# Patient Record
Sex: FEMALE | Race: OTHER | Employment: UNEMPLOYED | URBAN - METROPOLITAN AREA
[De-identification: names, ages, dates, MRNs, and addresses within clinical notes are randomized per-mention and may not be internally consistent; named-entity substitution may affect disease eponyms.]

---

## 2018-04-10 ENCOUNTER — HOSPITAL ENCOUNTER (OUTPATIENT)
Age: 58
Discharge: HOME | End: 2018-04-10
Payer: MEDICAID

## 2018-04-10 DIAGNOSIS — E03.9 ACQUIRED HYPOTHYROIDISM: ICD-10-CM

## 2018-04-10 DIAGNOSIS — E11.8 TYPE 2 DIABETES MELLITUS WITH COMPLICATION, WITHOUT LONG-TERM CURRENT USE OF INSULIN (HCC): ICD-10-CM

## 2018-04-10 LAB
CHOLESTEROL, FASTING: 151 MG/DL (ref 0–199)
HBA1C MFR BLD: 5.2 % (ref 4.8–5.9)
HDLC SERPL-MCNC: 43 MG/DL
LDL CHOLESTEROL CALCULATED: 68 MG/DL (ref 0–99)
TRIGLYCERIDE, FASTING: 201 MG/DL (ref 0–149)
TSH SERPL DL<=0.05 MIU/L-ACNC: 7.32 UIU/ML (ref 0.27–4.2)
VLDLC SERPL CALC-MCNC: 40 MG/DL

## 2018-04-10 PROCEDURE — 36415 COLL VENOUS BLD VENIPUNCTURE: CPT

## 2018-04-10 PROCEDURE — 80061 LIPID PANEL: CPT

## 2018-04-10 PROCEDURE — 83036 HEMOGLOBIN GLYCOSYLATED A1C: CPT

## 2018-04-10 PROCEDURE — 84443 ASSAY THYROID STIM HORMONE: CPT

## 2018-04-13 ENCOUNTER — TELEPHONE (OUTPATIENT)
Dept: INTERNAL MEDICINE | Age: 58
End: 2018-04-13

## 2018-04-13 DIAGNOSIS — E03.9 ACQUIRED HYPOTHYROIDISM: Primary | ICD-10-CM

## 2018-04-13 RX ORDER — LEVOTHYROXINE SODIUM 88 UG/1
88 TABLET ORAL DAILY
Qty: 90 TABLET | Refills: 1 | Status: SHIPPED | OUTPATIENT
Start: 2018-04-13 | End: 2018-07-11 | Stop reason: SDUPTHER

## 2018-05-03 ENCOUNTER — OFFICE VISIT (OUTPATIENT)
Dept: INTERNAL MEDICINE | Age: 58
End: 2018-05-03
Payer: MEDICAID

## 2018-05-03 VITALS
TEMPERATURE: 98.4 F | RESPIRATION RATE: 16 BRPM | SYSTOLIC BLOOD PRESSURE: 166 MMHG | HEIGHT: 60 IN | DIASTOLIC BLOOD PRESSURE: 88 MMHG | WEIGHT: 150 LBS | BODY MASS INDEX: 29.45 KG/M2 | HEART RATE: 66 BPM

## 2018-05-03 DIAGNOSIS — I10 ESSENTIAL HYPERTENSION: ICD-10-CM

## 2018-05-03 DIAGNOSIS — E78.2 MIXED HYPERLIPIDEMIA: Chronic | ICD-10-CM

## 2018-05-03 DIAGNOSIS — E55.9 VITAMIN D DEFICIENCY: ICD-10-CM

## 2018-05-03 DIAGNOSIS — E03.9 ACQUIRED HYPOTHYROIDISM: ICD-10-CM

## 2018-05-03 DIAGNOSIS — E11.8 TYPE 2 DIABETES MELLITUS WITH COMPLICATION, WITHOUT LONG-TERM CURRENT USE OF INSULIN (HCC): ICD-10-CM

## 2018-05-03 PROCEDURE — 3017F COLORECTAL CA SCREEN DOC REV: CPT | Performed by: INTERNAL MEDICINE

## 2018-05-03 PROCEDURE — 3044F HG A1C LEVEL LT 7.0%: CPT | Performed by: INTERNAL MEDICINE

## 2018-05-03 PROCEDURE — 2022F DILAT RTA XM EVC RTNOPTHY: CPT | Performed by: INTERNAL MEDICINE

## 2018-05-03 PROCEDURE — G8427 DOCREV CUR MEDS BY ELIG CLIN: HCPCS | Performed by: INTERNAL MEDICINE

## 2018-05-03 PROCEDURE — G8417 CALC BMI ABV UP PARAM F/U: HCPCS | Performed by: INTERNAL MEDICINE

## 2018-05-03 PROCEDURE — 1036F TOBACCO NON-USER: CPT | Performed by: INTERNAL MEDICINE

## 2018-05-03 PROCEDURE — 99212 OFFICE O/P EST SF 10 MIN: CPT | Performed by: INTERNAL MEDICINE

## 2018-05-03 PROCEDURE — 99214 OFFICE O/P EST MOD 30 MIN: CPT | Performed by: INTERNAL MEDICINE

## 2018-05-03 RX ORDER — LISINOPRIL 40 MG/1
40 TABLET ORAL NIGHTLY
Qty: 90 TABLET | Refills: 1
Start: 2018-05-03 | End: 2018-08-14 | Stop reason: SDUPTHER

## 2018-05-03 RX ORDER — MELATONIN
2 DAILY
Qty: 90 TABLET | Refills: 2 | Status: SHIPPED | OUTPATIENT
Start: 2018-05-03 | End: 2018-08-14 | Stop reason: SDUPTHER

## 2018-05-03 ASSESSMENT — PATIENT HEALTH QUESTIONNAIRE - PHQ9
2. FEELING DOWN, DEPRESSED OR HOPELESS: 0
SUM OF ALL RESPONSES TO PHQ QUESTIONS 1-9: 0
1. LITTLE INTEREST OR PLEASURE IN DOING THINGS: 0
SUM OF ALL RESPONSES TO PHQ9 QUESTIONS 1 & 2: 0

## 2018-05-03 ASSESSMENT — ENCOUNTER SYMPTOMS
COUGH: 0
SHORTNESS OF BREATH: 0
CONSTIPATION: 0
DIARRHEA: 0
HEARTBURN: 0

## 2018-07-09 ENCOUNTER — HOSPITAL ENCOUNTER (OUTPATIENT)
Age: 58
Discharge: HOME | End: 2018-07-09
Payer: MEDICAID

## 2018-07-09 DIAGNOSIS — E03.9 ACQUIRED HYPOTHYROIDISM: ICD-10-CM

## 2018-07-09 LAB
T4 FREE: 0.98 NG/DL (ref 0.93–1.7)
TSH SERPL DL<=0.05 MIU/L-ACNC: 7.35 UIU/ML (ref 0.27–4.2)

## 2018-07-09 PROCEDURE — 36415 COLL VENOUS BLD VENIPUNCTURE: CPT

## 2018-07-09 PROCEDURE — 84439 ASSAY OF FREE THYROXINE: CPT

## 2018-07-09 PROCEDURE — 84443 ASSAY THYROID STIM HORMONE: CPT

## 2018-07-11 ENCOUNTER — TELEPHONE (OUTPATIENT)
Dept: INTERNAL MEDICINE | Age: 58
End: 2018-07-11

## 2018-07-11 ENCOUNTER — OFFICE VISIT (OUTPATIENT)
Dept: INTERNAL MEDICINE | Age: 58
End: 2018-07-11
Payer: MEDICAID

## 2018-07-11 VITALS
BODY MASS INDEX: 29.53 KG/M2 | HEIGHT: 60 IN | DIASTOLIC BLOOD PRESSURE: 89 MMHG | RESPIRATION RATE: 16 BRPM | TEMPERATURE: 98.6 F | WEIGHT: 150.4 LBS | SYSTOLIC BLOOD PRESSURE: 170 MMHG | HEART RATE: 71 BPM

## 2018-07-11 DIAGNOSIS — Z23 NEED FOR PROPHYLACTIC VACCINATION AND INOCULATION AGAINST VARICELLA: ICD-10-CM

## 2018-07-11 DIAGNOSIS — E11.8 TYPE 2 DIABETES MELLITUS WITH COMPLICATION, WITHOUT LONG-TERM CURRENT USE OF INSULIN (HCC): ICD-10-CM

## 2018-07-11 DIAGNOSIS — E78.2 MIXED HYPERLIPIDEMIA: Chronic | ICD-10-CM

## 2018-07-11 DIAGNOSIS — E03.9 ACQUIRED HYPOTHYROIDISM: Chronic | ICD-10-CM

## 2018-07-11 DIAGNOSIS — Z12.31 ENCOUNTER FOR SCREENING MAMMOGRAM FOR BREAST CANCER: ICD-10-CM

## 2018-07-11 DIAGNOSIS — I10 ESSENTIAL HYPERTENSION: Primary | ICD-10-CM

## 2018-07-11 PROCEDURE — 3017F COLORECTAL CA SCREEN DOC REV: CPT | Performed by: INTERNAL MEDICINE

## 2018-07-11 PROCEDURE — G8427 DOCREV CUR MEDS BY ELIG CLIN: HCPCS | Performed by: INTERNAL MEDICINE

## 2018-07-11 PROCEDURE — G8417 CALC BMI ABV UP PARAM F/U: HCPCS | Performed by: INTERNAL MEDICINE

## 2018-07-11 PROCEDURE — 99212 OFFICE O/P EST SF 10 MIN: CPT | Performed by: INTERNAL MEDICINE

## 2018-07-11 PROCEDURE — 2022F DILAT RTA XM EVC RTNOPTHY: CPT | Performed by: INTERNAL MEDICINE

## 2018-07-11 PROCEDURE — 99214 OFFICE O/P EST MOD 30 MIN: CPT | Performed by: INTERNAL MEDICINE

## 2018-07-11 PROCEDURE — 3044F HG A1C LEVEL LT 7.0%: CPT | Performed by: INTERNAL MEDICINE

## 2018-07-11 PROCEDURE — 1036F TOBACCO NON-USER: CPT | Performed by: INTERNAL MEDICINE

## 2018-07-11 RX ORDER — HYDROCHLOROTHIAZIDE 25 MG/1
25 TABLET ORAL DAILY
Qty: 90 TABLET | Refills: 1 | Status: SHIPPED | OUTPATIENT
Start: 2018-07-11 | End: 2018-08-14 | Stop reason: SDUPTHER

## 2018-07-11 RX ORDER — SIMVASTATIN 20 MG
20 TABLET ORAL NIGHTLY
Qty: 90 TABLET | Refills: 1 | Status: SHIPPED | OUTPATIENT
Start: 2018-07-11 | End: 2018-08-14 | Stop reason: SDUPTHER

## 2018-07-11 RX ORDER — ASPIRIN 81 MG/1
81 TABLET ORAL DAILY
Qty: 90 TABLET | Refills: 1 | Status: SHIPPED | OUTPATIENT
Start: 2018-07-11 | End: 2018-08-14 | Stop reason: SDUPTHER

## 2018-07-11 RX ORDER — AMLODIPINE BESYLATE 10 MG/1
10 TABLET ORAL DAILY
Qty: 90 TABLET | Refills: 1 | Status: SHIPPED | OUTPATIENT
Start: 2018-07-11 | End: 2018-08-14 | Stop reason: SDUPTHER

## 2018-07-11 RX ORDER — SPIRONOLACTONE 25 MG/1
25 TABLET ORAL DAILY
Qty: 30 TABLET | Refills: 3 | Status: SHIPPED | OUTPATIENT
Start: 2018-07-11 | End: 2018-08-14 | Stop reason: SDUPTHER

## 2018-07-11 RX ORDER — LEVOTHYROXINE SODIUM 0.1 MG/1
100 TABLET ORAL DAILY
Qty: 90 TABLET | Refills: 1 | Status: SHIPPED | OUTPATIENT
Start: 2018-07-11 | End: 2018-08-14 | Stop reason: SDUPTHER

## 2018-07-11 ASSESSMENT — ENCOUNTER SYMPTOMS
SHORTNESS OF BREATH: 0
DOUBLE VISION: 0
BLURRED VISION: 0

## 2018-07-11 NOTE — PROGRESS NOTES
Discharge instructions given. Patient verbalizes understanding.
Used  [phone]
90 tablet 1    aspirin EC 81 MG EC tablet Take 1 tablet by mouth daily 90 tablet 1    simvastatin (ZOCOR) 20 MG tablet Take 1 tablet by mouth nightly 90 tablet 1    lisinopril (PRINIVIL;ZESTRIL) 40 MG tablet Take 1 tablet by mouth nightly 90 tablet 1    Cholecalciferol (VITAMIN D3) 1000 units TABS Take 2 tablets by mouth daily 90 tablet 2    levothyroxine (SYNTHROID) 88 MCG tablet Take 1 tablet by mouth daily 90 tablet 1    Glucose Blood (BLOOD GLUCOSE TEST STRIPS) STRP Check four times daily. OK to substitute per insurance coverage. 120 strip 3    ONETOUCH DELICA LANCETS 77M MISC TEST BLOOD SUGAR FOUR TIMES DAILY 100 each 3    Misc. Devices MISC One touch glucometer 1 Device 0    latanoprost (XALATAN) 0.005 % ophthalmic solution Place 1 drop into both eyes nightly 1 Bottle 3       I have reviewed all pertinent PMHx, PSHx, FamHx, SocialHx, medications, and allergies and updated history as appropriate. OBJECTIVE:    VS: BP (!) 170/89   Pulse 71   Temp 98.6 °F (37 °C) (Oral)   Resp 16   Ht 5' (1.524 m)   Wt 150 lb 6.4 oz (68.2 kg)   BMI 29.37 kg/m²   Physical Exam   Constitutional: She is oriented to person, place, and time. She appears well-developed. HENT:   Head: Normocephalic. Eyes: Pupils are equal, round, and reactive to light. Neck: Normal range of motion. Cardiovascular: Normal rate and regular rhythm. Pulmonary/Chest: Effort normal.   Abdominal: Soft. Musculoskeletal: Normal range of motion. Neurological: She is alert and oriented to person, place, and time. Psychiatric: She has a normal mood and affect. ASSESSMENT/PLAN:  The pt is female, 62 yo, PMH of DM II, HTN, hypothyroidism, presenting to the clinic because of high blood pressure, despite being on 3 types of HTN medications. 1/ Resistent HTN   3 meds, maximum dose, uncontrolled.    Add spironolactone 25mg     2/ Subclinical Hypothyroidism   TSH high, normal freeT4  Recheck TSH in 6 weeks  Increase levothyroxine

## 2018-07-12 ENCOUNTER — TELEPHONE (OUTPATIENT)
Dept: INTERNAL MEDICINE | Age: 58
End: 2018-07-12

## 2018-08-13 NOTE — TELEPHONE ENCOUNTER
We called the patient to discuss about her TSH lab result, which is high, and the decision to increase her Levothyroxine dose from 88 mcg to 100 mcg daily.     We used an interpretor, but she did not  her phone. We left a message and asked her to call back as soon as possible.  We are looking forward to her call to our interpretor, who will be at the clinic with me tomorrow morning.      I discussed the case with Dr. Darrick Castro.

## 2018-08-14 ENCOUNTER — OFFICE VISIT (OUTPATIENT)
Dept: INTERNAL MEDICINE | Age: 58
End: 2018-08-14
Payer: MEDICAID

## 2018-08-14 VITALS
WEIGHT: 150.6 LBS | HEIGHT: 60 IN | BODY MASS INDEX: 29.57 KG/M2 | SYSTOLIC BLOOD PRESSURE: 170 MMHG | DIASTOLIC BLOOD PRESSURE: 102 MMHG | RESPIRATION RATE: 20 BRPM | TEMPERATURE: 98.8 F | HEART RATE: 76 BPM

## 2018-08-14 DIAGNOSIS — E11.8 TYPE 2 DIABETES MELLITUS WITH COMPLICATION, WITHOUT LONG-TERM CURRENT USE OF INSULIN (HCC): Primary | ICD-10-CM

## 2018-08-14 DIAGNOSIS — E78.2 MIXED HYPERLIPIDEMIA: Chronic | ICD-10-CM

## 2018-08-14 DIAGNOSIS — E03.9 ACQUIRED HYPOTHYROIDISM: Chronic | ICD-10-CM

## 2018-08-14 DIAGNOSIS — Z12.39 BREAST CANCER SCREENING: ICD-10-CM

## 2018-08-14 DIAGNOSIS — I10 ESSENTIAL HYPERTENSION: ICD-10-CM

## 2018-08-14 DIAGNOSIS — E55.9 VITAMIN D DEFICIENCY: ICD-10-CM

## 2018-08-14 DIAGNOSIS — H40.1121 PRIMARY OPEN ANGLE GLAUCOMA (POAG) OF LEFT EYE, MILD STAGE: ICD-10-CM

## 2018-08-14 LAB — HBA1C MFR BLD: 5.4 %

## 2018-08-14 PROCEDURE — 83036 HEMOGLOBIN GLYCOSYLATED A1C: CPT | Performed by: INTERNAL MEDICINE

## 2018-08-14 PROCEDURE — 3044F HG A1C LEVEL LT 7.0%: CPT | Performed by: INTERNAL MEDICINE

## 2018-08-14 PROCEDURE — 1036F TOBACCO NON-USER: CPT | Performed by: INTERNAL MEDICINE

## 2018-08-14 PROCEDURE — 99213 OFFICE O/P EST LOW 20 MIN: CPT | Performed by: INTERNAL MEDICINE

## 2018-08-14 PROCEDURE — G8427 DOCREV CUR MEDS BY ELIG CLIN: HCPCS | Performed by: INTERNAL MEDICINE

## 2018-08-14 PROCEDURE — G8417 CALC BMI ABV UP PARAM F/U: HCPCS | Performed by: INTERNAL MEDICINE

## 2018-08-14 PROCEDURE — 2022F DILAT RTA XM EVC RTNOPTHY: CPT | Performed by: INTERNAL MEDICINE

## 2018-08-14 PROCEDURE — 99214 OFFICE O/P EST MOD 30 MIN: CPT | Performed by: INTERNAL MEDICINE

## 2018-08-14 PROCEDURE — 3017F COLORECTAL CA SCREEN DOC REV: CPT | Performed by: INTERNAL MEDICINE

## 2018-08-14 RX ORDER — LANCETS 33 GAUGE
EACH MISCELLANEOUS
Qty: 100 EACH | Refills: 3 | Status: SHIPPED | OUTPATIENT
Start: 2018-08-14 | End: 2018-12-05 | Stop reason: SDUPTHER

## 2018-08-14 RX ORDER — SPIRONOLACTONE 25 MG/1
25 TABLET ORAL DAILY
Qty: 30 TABLET | Refills: 3 | Status: SHIPPED | OUTPATIENT
Start: 2018-08-14 | End: 2018-10-22 | Stop reason: SDUPTHER

## 2018-08-14 RX ORDER — LEVOTHYROXINE SODIUM 0.1 MG/1
100 TABLET ORAL DAILY
Qty: 90 TABLET | Refills: 1 | Status: SHIPPED | OUTPATIENT
Start: 2018-08-14 | End: 2018-10-22 | Stop reason: SDUPTHER

## 2018-08-14 RX ORDER — HYDRALAZINE HYDROCHLORIDE 25 MG/1
25 TABLET, FILM COATED ORAL 3 TIMES DAILY
Qty: 90 TABLET | Refills: 3 | Status: SHIPPED | OUTPATIENT
Start: 2018-08-14 | End: 2018-10-22 | Stop reason: SDUPTHER

## 2018-08-14 RX ORDER — ADHESIVE BANDAGE 3/4"
BANDAGE TOPICAL
Qty: 1 EACH | Refills: 0 | Status: SHIPPED | OUTPATIENT
Start: 2018-08-14 | End: 2018-08-28 | Stop reason: SDUPTHER

## 2018-08-14 RX ORDER — GLUCOSAMINE HCL/CHONDROITIN SU 500-400 MG
CAPSULE ORAL
Qty: 120 STRIP | Refills: 3 | Status: SHIPPED | OUTPATIENT
Start: 2018-08-14 | End: 2018-10-22 | Stop reason: SDUPTHER

## 2018-08-14 RX ORDER — LISINOPRIL 40 MG/1
40 TABLET ORAL NIGHTLY
Qty: 90 TABLET | Refills: 1 | Status: SHIPPED | OUTPATIENT
Start: 2018-08-14 | End: 2018-10-22 | Stop reason: SDUPTHER

## 2018-08-14 RX ORDER — HYDROCHLOROTHIAZIDE 25 MG/1
25 TABLET ORAL DAILY
Qty: 90 TABLET | Refills: 1 | Status: SHIPPED | OUTPATIENT
Start: 2018-08-14 | End: 2018-10-22 | Stop reason: SDUPTHER

## 2018-08-14 RX ORDER — SIMVASTATIN 20 MG
20 TABLET ORAL NIGHTLY
Qty: 90 TABLET | Refills: 1 | Status: SHIPPED | OUTPATIENT
Start: 2018-08-14 | End: 2018-10-22 | Stop reason: SDUPTHER

## 2018-08-14 RX ORDER — AMLODIPINE BESYLATE 10 MG/1
10 TABLET ORAL DAILY
Qty: 90 TABLET | Refills: 1 | Status: SHIPPED | OUTPATIENT
Start: 2018-08-14 | End: 2018-10-22 | Stop reason: SDUPTHER

## 2018-08-14 RX ORDER — MELATONIN
2 DAILY
Qty: 90 TABLET | Refills: 2 | Status: SHIPPED | OUTPATIENT
Start: 2018-08-14 | End: 2018-10-22 | Stop reason: SDUPTHER

## 2018-08-14 RX ORDER — ASPIRIN 81 MG/1
81 TABLET ORAL DAILY
Qty: 90 TABLET | Refills: 1 | Status: SHIPPED | OUTPATIENT
Start: 2018-08-14 | End: 2018-10-22 | Stop reason: SDUPTHER

## 2018-08-14 ASSESSMENT — ENCOUNTER SYMPTOMS
EYE PAIN: 0
EYES NEGATIVE: 1
SHORTNESS OF BREATH: 0
ALLERGIC/IMMUNOLOGIC NEGATIVE: 1
COUGH: 0
EYE REDNESS: 0
PHOTOPHOBIA: 0
ABDOMINAL PAIN: 0

## 2018-08-14 NOTE — PATIENT INSTRUCTIONS
Patient Education          hydralazine  Pronunciation:  jean-pierre callahan  Brand:  Apresoline  What is the most important information I should know about hydralazine? You should not use this medicine if you have coronary artery disease, or rheumatic heart disease affecting the mitral valve. What is hydralazine? Hydralazine is a vasodilator that works by relaxing the muscles in your blood vessels to help them dilate (widen). This lowers blood pressure and allows blood to flow more easily through your veins and arteries. Hydralazine is used to treat high blood pressure (hypertension). Hydralazine may also be used for purposes not listed in this medication guide. What should I discuss with my healthcare provider before taking hydralazine? You should not use hydralazine if you are allergic to it, or if you have:  · coronary artery disease; or  · rheumatic heart disease affecting the mitral valve. To make sure hydralazine is safe for you, tell your doctor if you have ever had:  · kidney disease;  · systemic lupus erythematosus;  · angina (chest pain); or  · a stroke. It is not known whether this medicine will harm an unborn baby. Tell your doctor if you are pregnant or plan to become pregnant. Hydralazine can pass into breast milk, but effects on the nursing baby are not known. Tell your doctor if you are breast-feeding. Hydralazine is not approved for use by anyone younger than 25years old. How should I take hydralazine? Follow all directions on your prescription label. Do not take this medicine in larger or smaller amounts or for longer than recommended. Your blood pressure will need to be checked often. You may also need frequent blood tests. Keep using this medicine as directed, even if you feel well. High blood pressure often has no symptoms. You may need to use blood pressure medicine for the rest of your life. Store at room temperature away from moisture and heat.   What happens if I miss a drugs may interact with hydralazine, including prescription and over-the-counter medicines, vitamins, and herbal products. Not all possible interactions are listed in this medication guide. Where can I get more information? Your pharmacist can provide more information about hydralazine. Remember, keep this and all other medicines out of the reach of children, never share your medicines with others, and use this medication only for the indication prescribed. Every effort has been made to ensure that the information provided by [address] is accurate, up-to-date, and complete, but no guarantee is made to that effect. Drug information contained herein may be time sensitive. [facility] information has been compiled for use by healthcare practitioners and consumers in the [place] and therefore [facility] does not warrant that uses outside of the [place] are appropriate, unless specifically indicated otherwise. [facility]'s drug information does not endorse drugs, diagnose patients or recommend therapy. [facility] drug information is an informational resource designed to assist licensed healthcare practitioners in caring for their patients and/or to serve consumers viewing this service as a supplement to, and not a substitute for, the expertise, skill, knowledge and judgment of healthcare practitioners. The absence of a warning for a given drug or drug combination in no way should be construed to indicate that the drug or drug combination is safe, effective or appropriate for any given patient. [facility] does not assume any responsibility for any aspect of healthcare administered with the aid of information [facility] provides. The information contained herein is not intended to cover all possible uses, directions, precautions, warnings, drug interactions, allergic reactions, or adverse effects.  If you have questions about the drugs you are taking, check with your doctor, nurse or pharmacist.  Copyright

## 2018-08-15 NOTE — PROGRESS NOTES
I saw and examined the patient with Dr. Wilmar Bowen.  Patient here for HTN and DM follow-up. Patient reports taking her BP meds today. She does not check her blood pressure on her own at home. Her DM is under very good control. Exam as per resident exam which reflects my own exam with the following additions:     Vitals:    08/14/18 0844 08/14/18 0850   BP: (!) 170/98 (!) 170/102   Pulse: 76 76   Resp: 18 20   Temp: 98.8 °F (37.1 °C)    TempSrc: Oral    Weight: 150 lb 9.6 oz (68.3 kg)    Height: 5' (1.524 m)        Lungs:  CTA B  Neck:   No carotid bruits appreciated B.   CVS:  +s1/s2 without m/g/r appreciated. Abd:  + BS, NTND, No renal or aortic bruits   Extr:  2+ DP/PT pulses B, no pitting edema    I reviewed patient labs. HbA1c : 5.4  Assessment/Plan:  1. DM - Type 2 - very well-controlled   Decreased metformin dose and if the next recheck hemoglobin A1c remains under good control would discontinue metformin completely. 2. HTN - uncontrolled   Add Hydralazine   This could be white coat HTN, so advised patient to check BP on an ambulatory basis.       3.  Glaucoma - no recent follow-up   Refer back to ophthalmology for further follow-up    Rashaad Henderson MD   8/19/2018
Patient verbalized understanding of office instructions. She will call with questions or concerns. She was given discharge instructions, and scripts for    lab work and Blood pressure cuff  all questions were fully answered.  Given  Printed AVS
Prior to Visit Medications    Medication Sig Taking? Authorizing Provider   zoster recombinant adjuvanted vaccine (SHINGRIX) 50 MCG SUSR injection 50 MCG IM then repeat 2-6 months. Livingston Fothergill, DO   spironolactone (ALDACTONE) 25 MG tablet Take 1 tablet by mouth daily  Livingston Fothergill,    hydrochlorothiazide (HYDRODIURIL) 25 MG tablet Take 1 tablet by mouth daily  Livingston Fothergill,    metFORMIN (GLUCOPHAGE) 1000 MG tablet Take 1 tablet by mouth 2 times daily (with meals)  Livingston Fothergill,    amLODIPine (NORVASC) 10 MG tablet Take 1 tablet by mouth daily  Livingston Fothergill, DO   aspirin EC 81 MG EC tablet Take 1 tablet by mouth daily  Livingston Fothergill, DO   simvastatin (ZOCOR) 20 MG tablet Take 1 tablet by mouth nightly  Livingston Fothergill, DO   levothyroxine (SYNTHROID) 100 MCG tablet Take 1 tablet by mouth daily  Livingston Fothergill, DO   lisinopril (PRINIVIL;ZESTRIL) 40 MG tablet Take 1 tablet by mouth nightly  Wendy Tan,    Cholecalciferol (VITAMIN D3) 1000 units TABS Take 2 tablets by mouth daily  Nate Paz DO   Glucose Blood (BLOOD GLUCOSE TEST STRIPS) STRP Check four times daily. OK to substitute per insurance coverage. Yesica Sinclair MD   Gabriela Narrow LANCETS 22W MISC TEST BLOOD SUGAR FOUR TIMES DAILY  Junaid Marshall MD   Misc. Devices MISC One touch glucometer  Nate Paz DO   latanoprost (XALATAN) 0.005 % ophthalmic solution Place 1 drop into both eyes nightly  Nate Paz DO   Blood Pressure Monitoring (BLOOD PRESSURE CUFF) MISC 1 each by Does not apply route daily. Oskar Guallpa DO        Social History   Substance Use Topics    Smoking status: Former Smoker     Packs/day: 0.10     Types: Cigarettes     Quit date: 4/18/2013    Smokeless tobacco: Never Used      Comment: stop 2013    Alcohol use No        There were no vitals filed for this visit.   Estimated body mass index is 29.37 kg/m² as calculated

## 2018-08-28 ENCOUNTER — OFFICE VISIT (OUTPATIENT)
Dept: INTERNAL MEDICINE | Age: 58
End: 2018-08-28
Payer: MEDICAID

## 2018-08-28 VITALS
OXYGEN SATURATION: 99 % | HEIGHT: 60 IN | BODY MASS INDEX: 29.04 KG/M2 | TEMPERATURE: 98.9 F | HEART RATE: 69 BPM | WEIGHT: 147.9 LBS | DIASTOLIC BLOOD PRESSURE: 60 MMHG | RESPIRATION RATE: 16 BRPM | SYSTOLIC BLOOD PRESSURE: 102 MMHG

## 2018-08-28 DIAGNOSIS — I10 ESSENTIAL HYPERTENSION: Primary | Chronic | ICD-10-CM

## 2018-08-28 PROCEDURE — 1036F TOBACCO NON-USER: CPT | Performed by: STUDENT IN AN ORGANIZED HEALTH CARE EDUCATION/TRAINING PROGRAM

## 2018-08-28 PROCEDURE — 99213 OFFICE O/P EST LOW 20 MIN: CPT | Performed by: STUDENT IN AN ORGANIZED HEALTH CARE EDUCATION/TRAINING PROGRAM

## 2018-08-28 PROCEDURE — G8427 DOCREV CUR MEDS BY ELIG CLIN: HCPCS | Performed by: STUDENT IN AN ORGANIZED HEALTH CARE EDUCATION/TRAINING PROGRAM

## 2018-08-28 PROCEDURE — G8417 CALC BMI ABV UP PARAM F/U: HCPCS | Performed by: STUDENT IN AN ORGANIZED HEALTH CARE EDUCATION/TRAINING PROGRAM

## 2018-08-28 PROCEDURE — 99212 OFFICE O/P EST SF 10 MIN: CPT | Performed by: STUDENT IN AN ORGANIZED HEALTH CARE EDUCATION/TRAINING PROGRAM

## 2018-08-28 PROCEDURE — 3017F COLORECTAL CA SCREEN DOC REV: CPT | Performed by: STUDENT IN AN ORGANIZED HEALTH CARE EDUCATION/TRAINING PROGRAM

## 2018-08-28 RX ORDER — ADHESIVE BANDAGE 3/4"
1 BANDAGE TOPICAL DAILY
Qty: 1 EACH | Refills: 0 | Status: SHIPPED | OUTPATIENT
Start: 2018-08-28 | End: 2018-08-28 | Stop reason: SDUPTHER

## 2018-08-28 RX ORDER — ADHESIVE BANDAGE 3/4"
1 BANDAGE TOPICAL DAILY
Qty: 1 EACH | Refills: 0 | Status: SHIPPED | OUTPATIENT
Start: 2018-08-28 | End: 2019-07-17

## 2018-08-28 NOTE — PROGRESS NOTES
[facility] Tim[facility]  Internal Medicine Residency Program  [facility] Note      SUBJECTIVE:  CC: had concerns including 2 Week Follow-Up (Check on BP). HPI: Tammy Fish presents to the [facility] for blood pressure check. Last visit Hydralazine was added to her BP medications. She states that she is tolerating this well, and denies any side effects. Last visit on 8/14 her BP was 170/102, however today is 102/60. She states that she is feeling well today and has no complaints today. She is unable to check her blood pressure at home as she was told by the pharmacy that she was unable to give her a blood pressure cuff. She has not tried to obtain it from a medical supplies store. Her medications were last refilled on visit on 8/14. History obtained with assistance of [place] speaking . Review Of Systems:  General: no fevers, chills, weight loss or gain.    Ears/Nose/Throat: no hearing loss, tinnitus, vertigo, nosebleed, nasal congestion, rhinorrhea, sore throat  Respiratory: no cough, pleuritic chest pain, dyspnea, or wheezing  Cardiovascular: no chest pain, angina, dyspnea on exertion, orthopnea, PND, palpitations, or claudication  Gastrointestinal: no nausea, vomiting, heartburn, diarrhea, constipation, abdominal pain, hematochezia or melena  Genitourinary: no urinary urgency, frequency, dysuria, nocturia, hesitancy, or incontinence  Musculoskeletal: no arthritis, arthralgia, myalgia, weakness, or morning stiffness  Skin: no abnormal pigmentation, rash, itching, masses, hair or nail changes    Outpatient Prescriptions Marked as Taking for the 8/28/18 encounter (Office Visit) with URGENT CARE 1   Medication Sig Dispense Refill    Blood Pressure Monitoring (BLOOD PRESSURE CUFF) MISC Dx:  Hypertension with labile blood pressure 1 each 0    hydrALAZINE (APRESOLINE) 25 MG tablet Take 1 tablet by mouth three times daily for 21 days 90 tablet 3    spironolactone (ALDACTONE) 25 MG tablet Take 1 tablet

## 2018-08-28 NOTE — PROGRESS NOTES
Attending Physician Statement  I have discussed the case, including pertinent history and exam findings with the resident. I have seen and examined the patient and the key elements of the encounter have been performed by me. I agree with the assessment, plan and orders as documented by the resident. Pt presented for the follow up of hypertension, tolerating meds well, continue current treatment and follow up in few months.   Willam Harada

## 2018-08-28 NOTE — PATIENT INSTRUCTIONS
causar sangrado grave. · Visite a forrest médico periódicamente. Usted podría necesitar consultar a forrest médico con más frecuencia al principio o hasta que se reduzca forrest presión arterial.  · Si usted está tomando medicamentos para la presión arterial, hable con forrest médico antes de belen medicamentos descongestionantes o antiinflamatorios, pollo ibuprofeno. Algunos de estos medicamentos pueden elevar la presión arterial.  · Aprenda a revisarse la presión arterial en forrest hogar. Cambios en el estilo de bladimir  · Mantenga un peso saludable. Theba es particularmente importante si aumenta de peso en la tenzin de la cintura. Bajar solo 10 libras (4.5 kg) puede ayudarle a reducir forrest presión arterial.  · Rosangela más ejercicios si forrest médico se lo recomienda. Caminar es coral buena opción. Poco a poco, aumente la distancia que [employer]. Intente hacer por lo menos 30 minutos de ejercicio la mayoría de los días de la [place]. También podría nadar, montar en bicicleta o hacer otras actividades. · No tome alcohol o limite la cantidad que monica. Hable con forrest médico acerca de si puede belen alcohol. · Trate de limitar la cantidad de sodio que consume a menos de 2,300 miligramos (mg) al día. Forrest médico podría pedirle que trate de consumir menos de 1,500 mg al día. · Coma abundantes frutas (pollo bananas [plátanos] y naranjas), verduras, legumbres, granos integrales y productos lácteos de bajo contenido de [place]. · Reduzca la cantidad de grasas saturadas en forrest dieta. Los productos derivados de los [place], pollo la [place], el queso y la carne, contienen grasas saturadas. El limitar estos alimentos podría ayudarle a bajar de peso y [place] reducir forrest riesgo de coral enfermedad cardíaca. · No fume. El hábito de fumar aumenta forrest riesgo de ataque cerebral y ataque al corazón. Si necesita ayuda para dejar de fumar, hable con forrest médico sobre programas y medicamentos para dejar de fumar.  Estos pueden aumentar matti probabilidades de dejar el hábito para siempre. ¿Cuándo debe pedir ayuda? Llame al 911 en cualquier momento que considere que necesita atención de [place]. [place] puede significar que tiene síntomas que sugieren que forrest presión arterial le está causando un problema cardíaco o vascular grave. Forrest presión arterial podría ser superior a 180/110.   Por ejemplo, llame al 911 si:    · Tiene síntomas de un ataque al corazón. Estos pueden incluir:  ¨ Dolor o presión en el pecho, o coral sensación extraña en el pecho. ¨ Sudoración. ¨ Falta de aire. ¨ Náuseas o vómito. ¨ Dolor, presión o coral sensación extraña en la espalda, el gio, la mandíbula, la parte superior del abdomen o en daniel o ambos hombros o brazos. ¨ Aturdimiento o debilidad repentina. ¨ Latidos del corazón rápidos o irregulares.     · Tiene síntomas de un ataque cerebral. Estos pueden incluir:  ¨ Entumecimiento, hormigueo, debilidad o parálisis repentinos en la lex, el brazo o la pierna, sobre todo en un solo lado del cuerpo. ¨ Cambios repentinos en la visión. ¨ Dificultades repentinas para hablar. ¨ Confusión repentina o dificultad súbita para comprender frases sencillas. ¨ Problemas repentinos para caminar o mantener el equilibrio. ¨ Dolor de [place] intenso y repentino, distinto de los donald de anais anteriores.     · Tiene un dolor intenso en la espalda o el abdomen.    No espere a que la presión arterial baje por sí leo. Obtenga ayuda de inmediato.   Llame a forrest médico ahora mismo o busque atención de inmediato si:    · Tiene la presión arterial mucho más oneyda de lo normal (pollo 180/110 o más oneyda), kaci no tiene síntomas.     · Piensa que la presión arterial oneyda le está provocando síntomas, pollo:  ¨ Dolor de anais intenso. ¨ Visión borrosa.    Vigile de cerca los cambios en forrest ryan, y asegúrese de comunicarse con forrest médico si:    · Forrest presión arterial mide 140/90 o más en al menos 2 ocasiones.  [place] significa que el número de [place] es 140 o superior o el número de [place] es

## 2018-09-05 ENCOUNTER — HOSPITAL ENCOUNTER (OUTPATIENT)
Dept: GENERAL RADIOLOGY | Age: 58
Discharge: HOME | End: 2018-09-07
Payer: MEDICAID

## 2018-09-05 DIAGNOSIS — Z12.39 BREAST CANCER SCREENING: ICD-10-CM

## 2018-09-05 PROCEDURE — 77063 BREAST TOMOSYNTHESIS BI: CPT

## 2018-10-22 ENCOUNTER — OFFICE VISIT (OUTPATIENT)
Dept: INTERNAL MEDICINE | Age: 58
End: 2018-10-22
Payer: MEDICAID

## 2018-10-22 VITALS
SYSTOLIC BLOOD PRESSURE: 182 MMHG | RESPIRATION RATE: 16 BRPM | BODY MASS INDEX: 30.51 KG/M2 | TEMPERATURE: 98.2 F | HEART RATE: 68 BPM | DIASTOLIC BLOOD PRESSURE: 85 MMHG | WEIGHT: 155.4 LBS | HEIGHT: 60 IN

## 2018-10-22 DIAGNOSIS — E11.8 TYPE 2 DIABETES MELLITUS WITH COMPLICATION, WITHOUT LONG-TERM CURRENT USE OF INSULIN (HCC): ICD-10-CM

## 2018-10-22 DIAGNOSIS — E03.9 ACQUIRED HYPOTHYROIDISM: Chronic | ICD-10-CM

## 2018-10-22 DIAGNOSIS — I10 ESSENTIAL HYPERTENSION: ICD-10-CM

## 2018-10-22 DIAGNOSIS — E78.2 MIXED HYPERLIPIDEMIA: Chronic | ICD-10-CM

## 2018-10-22 DIAGNOSIS — E03.9 HYPOTHYROIDISM, UNSPECIFIED TYPE: Primary | ICD-10-CM

## 2018-10-22 DIAGNOSIS — E55.9 VITAMIN D DEFICIENCY: ICD-10-CM

## 2018-10-22 PROCEDURE — G8427 DOCREV CUR MEDS BY ELIG CLIN: HCPCS | Performed by: INTERNAL MEDICINE

## 2018-10-22 PROCEDURE — 99214 OFFICE O/P EST MOD 30 MIN: CPT | Performed by: INTERNAL MEDICINE

## 2018-10-22 PROCEDURE — 36415 COLL VENOUS BLD VENIPUNCTURE: CPT | Performed by: INTERNAL MEDICINE

## 2018-10-22 PROCEDURE — G8484 FLU IMMUNIZE NO ADMIN: HCPCS | Performed by: INTERNAL MEDICINE

## 2018-10-22 PROCEDURE — 99202 OFFICE O/P NEW SF 15 MIN: CPT | Performed by: INTERNAL MEDICINE

## 2018-10-22 PROCEDURE — 2022F DILAT RTA XM EVC RTNOPTHY: CPT | Performed by: INTERNAL MEDICINE

## 2018-10-22 PROCEDURE — 3044F HG A1C LEVEL LT 7.0%: CPT | Performed by: INTERNAL MEDICINE

## 2018-10-22 PROCEDURE — G8417 CALC BMI ABV UP PARAM F/U: HCPCS | Performed by: INTERNAL MEDICINE

## 2018-10-22 PROCEDURE — 3017F COLORECTAL CA SCREEN DOC REV: CPT | Performed by: INTERNAL MEDICINE

## 2018-10-22 PROCEDURE — 1036F TOBACCO NON-USER: CPT | Performed by: INTERNAL MEDICINE

## 2018-10-22 RX ORDER — GLUCOSAMINE HCL/CHONDROITIN SU 500-400 MG
CAPSULE ORAL
Qty: 120 STRIP | Refills: 3 | Status: SHIPPED | OUTPATIENT
Start: 2018-10-22 | End: 2018-12-05 | Stop reason: SDUPTHER

## 2018-10-22 RX ORDER — AMLODIPINE BESYLATE 10 MG/1
10 TABLET ORAL DAILY
Qty: 90 TABLET | Refills: 3 | Status: SHIPPED | OUTPATIENT
Start: 2018-10-22 | End: 2019-03-19 | Stop reason: SDUPTHER

## 2018-10-22 RX ORDER — LEVOTHYROXINE SODIUM 0.1 MG/1
100 TABLET ORAL DAILY
Qty: 90 TABLET | Refills: 1 | Status: SHIPPED | OUTPATIENT
Start: 2018-10-22 | End: 2019-03-19 | Stop reason: SDUPTHER

## 2018-10-22 RX ORDER — MELATONIN
2 DAILY
Qty: 90 TABLET | Refills: 2 | Status: SHIPPED | OUTPATIENT
Start: 2018-10-22 | End: 2019-07-17 | Stop reason: SDUPTHER

## 2018-10-22 RX ORDER — SIMVASTATIN 20 MG
20 TABLET ORAL NIGHTLY
Qty: 90 TABLET | Refills: 1 | Status: SHIPPED | OUTPATIENT
Start: 2018-10-22 | End: 2019-03-19 | Stop reason: SDUPTHER

## 2018-10-22 RX ORDER — SPIRONOLACTONE 25 MG/1
25 TABLET ORAL DAILY
Qty: 30 TABLET | Refills: 3 | Status: SHIPPED | OUTPATIENT
Start: 2018-10-22 | End: 2019-03-19 | Stop reason: SDUPTHER

## 2018-10-22 RX ORDER — HYDRALAZINE HYDROCHLORIDE 25 MG/1
25 TABLET, FILM COATED ORAL 3 TIMES DAILY
Qty: 90 TABLET | Refills: 3 | Status: SHIPPED | OUTPATIENT
Start: 2018-10-22 | End: 2018-12-05 | Stop reason: SDUPTHER

## 2018-10-22 RX ORDER — LISINOPRIL 40 MG/1
40 TABLET ORAL NIGHTLY
Qty: 90 TABLET | Refills: 1 | Status: SHIPPED | OUTPATIENT
Start: 2018-10-22 | End: 2019-03-19 | Stop reason: SDUPTHER

## 2018-10-22 RX ORDER — ASPIRIN 81 MG/1
81 TABLET ORAL DAILY
Qty: 90 TABLET | Refills: 1 | Status: SHIPPED | OUTPATIENT
Start: 2018-10-22 | End: 2019-03-19 | Stop reason: SDUPTHER

## 2018-10-22 RX ORDER — LATANOPROST 50 UG/ML
1 SOLUTION/ DROPS OPHTHALMIC NIGHTLY
Qty: 1 BOTTLE | Refills: 3 | Status: SHIPPED | OUTPATIENT
Start: 2018-10-22 | End: 2019-03-19 | Stop reason: SDUPTHER

## 2018-10-22 RX ORDER — HYDROCHLOROTHIAZIDE 25 MG/1
25 TABLET ORAL DAILY
Qty: 30 TABLET | Refills: 3 | Status: SHIPPED | OUTPATIENT
Start: 2018-10-22 | End: 2019-03-19 | Stop reason: SDUPTHER

## 2018-10-22 ASSESSMENT — ENCOUNTER SYMPTOMS
SHORTNESS OF BREATH: 0
RESPIRATORY NEGATIVE: 1
EYES NEGATIVE: 1
ALLERGIC/IMMUNOLOGIC NEGATIVE: 1
COUGH: 0
GASTROINTESTINAL NEGATIVE: 1
CHEST TIGHTNESS: 0

## 2018-10-22 NOTE — PROGRESS NOTES
10/22/2018   We are using professional  service.  [id number]   Margi Vaca (:  1960) is a 61 y.o. female, here for evaluation of the following medical concerns: high blood pressure. She had high blood pressure, hardly controlled previously, with multiple medications. Last visit, her TRACY was well controlled, 102/60, with addition of Hydralazine 25mg tid. Today, her /85, she admitted not taking her meds this morning. She usually takes meds afternoon. She was not able to get her prescribed BP cuff in the last two visits, possibly her insurance not covering. But she is willing to check her BP at home/grocery. She denies barriers taking meds, side effects, or headache, chest pain, sob. She also denies hot/cold intolerance, constipation, leg swelling. HPI      Review of Systems   Constitutional: Negative for activity change and appetite change. HENT: Negative. Eyes: Negative. Respiratory: Negative. Negative for cough, chest tightness and shortness of breath. Cardiovascular: Negative. Negative for chest pain, palpitations and leg swelling. Gastrointestinal: Negative. Endocrine: Negative. Genitourinary: Negative. Musculoskeletal: Negative. Allergic/Immunologic: Negative. Neurological: Negative. Prior to Visit Medications    Medication Sig Taking?  Authorizing Provider   hydrALAZINE (APRESOLINE) 25 MG tablet Take 1 tablet by mouth three times daily Yes Berkley Wilson MD   spironolactone (ALDACTONE) 25 MG tablet Take 1 tablet by mouth daily Yes Berkley Wilson MD   hydrochlorothiazide (HYDRODIURIL) 25 MG tablet Take 1 tablet by mouth daily Yes Berkley Wilson MD   metFORMIN (GLUCOPHAGE) 500 MG tablet Take 1 tablet by mouth 2 times daily (with meals) Yes Berkley Wilson MD   amLODIPine (NORVASC) 10 MG tablet Take 1 tablet by mouth daily Yes Berkley Wilson MD   aspirin EC 81 MG EC tablet Take 1 tablet by mouth daily Yes Berkley Wilson MD   simvastatin (ZOCOR) 20 MG tablet Take 1 tablet by mouth nightly Yes Cherelle Torres MD   levothyroxine (SYNTHROID) 100 MCG tablet Take 1 tablet by mouth daily Yes Cherelle Torres MD   lisinopril (PRINIVIL;ZESTRIL) 40 MG tablet Take 1 tablet by mouth nightly Yes Cherelle Torres MD   Cholecalciferol (VITAMIN D3) 1000 units TABS Take 2 tablets by mouth daily Yes Cherelle Torres MD   blood glucose monitor strips Check four times daily. OK to substitute per insurance coverage. Yes Cherelle Torres MD   latanoprost (XALATAN) 0.005 % ophthalmic solution Place 1 drop into both eyes nightly Yes Cherelle Torres MD   Blood Pressure Monitoring (BLOOD PRESSURE CUFF) MISC 1 each by Does not apply route daily Yes Moni Cai, DO   ONETOUCH DELICA LANCETS 60B MISC TEST BLOOD SUGAR FOUR TIMES DAILY Yes Cherelle Torres MD   zoster recombinant adjuvanted vaccine (SHINGRIX) 50 MCG SUSR injection 50 MCG IM then repeat 2-6 months. Yes Amando Fitzgerald, DO   Misc. Devices MISC One touch glucometer  Cherelle Torres MD        Social History   Substance Use Topics    Smoking status: Former Smoker     Packs/day: 0.10     Years: 1.00     Types: Cigarettes     Quit date: 4/18/2013    Smokeless tobacco: Never Used      Comment: stop 2013    Alcohol use No        Vitals:    10/22/18 1001 10/22/18 1008   BP: (!) 180/82 (!) 182/85   Site: Right Upper Arm    Position: Sitting    Cuff Size: Medium Adult    Pulse: 68    Resp: 16    Temp: 98.2 °F (36.8 °C)    TempSrc: Oral    Weight: 155 lb 6.4 oz (70.5 kg)    Height: 5' (1.524 m)      Estimated body mass index is 30.35 kg/m² as calculated from the following:    Height as of this encounter: 5' (1.524 m). Weight as of this encounter: 155 lb 6.4 oz (70.5 kg). Physical Exam   Constitutional: She is oriented to person, place, and time. She appears well-developed. HENT:   Head: Normocephalic. Eyes: Pupils are equal, round, and reactive to light. Neck: Normal range of motion. Cardiovascular: Normal rate. Pulmonary/Chest: Effort normal.   Abdominal: Soft. Musculoskeletal: Normal range of motion. Neurological: She is alert and oriented to person, place, and time. Skin: Skin is warm. Psychiatric: She has a normal mood and affect. ASSESSMENT/PLAN:  1. Essential hypertension  Discussed about check blood pressure at grocery store twice a week. We will call her to check the measure in a week. - spironolactone (ALDACTONE) 25 MG tablet; Take 1 tablet by mouth daily  Dispense: 30 tablet; Refill: 3  - hydrochlorothiazide (HYDRODIURIL) 25 MG tablet; Take 1 tablet by mouth daily  Dispense: 30 tablet; Refill: 3  - amLODIPine (NORVASC) 10 MG tablet; Take 1 tablet by mouth daily  Dispense: 90 tablet; Refill: 3  - aspirin EC 81 MG EC tablet; Take 1 tablet by mouth daily  Dispense: 90 tablet; Refill: 1  - lisinopril (PRINIVIL;ZESTRIL) 40 MG tablet; Take 1 tablet by mouth nightly  Dispense: 90 tablet; Refill: 1    2. Type 2 diabetes mellitus with complication, without long-term current use of insulin (HCC)  - metFORMIN (GLUCOPHAGE) 500 MG tablet; Take 1 tablet by mouth 2 times daily (with meals)  Dispense: 180 tablet; Refill: 1  - Will check A1c next visit   - blood glucose monitor strips; Check four times daily. OK to substitute per insurance coverage. Dispense: 120 strip; Refill: 3    3. Mixed hyperlipidemia    - simvastatin (ZOCOR) 20 MG tablet; Take 1 tablet by mouth nightly  Dispense: 90 tablet; Refill: 1    4. Acquired hypothyroidism  - Check TSH today visit   - levothyroxine (SYNTHROID) 100 MCG tablet; Take 1 tablet by mouth daily  Dispense: 90 tablet; Refill: 1    5. Vitamin D deficiency  - Cholecalciferol (VITAMIN D3) 1000 units TABS; Take 2 tablets by mouth daily  Dispense: 90 tablet; Refill: 2    Return in about 5 weeks (around 11/26/2018). An electronic signature was used to authenticate this note.     --Cherelle Torres MD on 10/22/2018 at 11:44 AM  Attending physician: Dr. Brian Ryan

## 2018-10-22 NOTE — PATIENT INSTRUCTIONS
1. Please check your Blood pressure twice a week at [facility], [employer], [employer], [employer] [facility]. And call us, or our staff Baylee Sanchez) will call you in a week     2. Please take all medications as prescribed.

## 2018-12-05 ENCOUNTER — OFFICE VISIT (OUTPATIENT)
Dept: INTERNAL MEDICINE | Age: 58
End: 2018-12-05
Payer: MEDICAID

## 2018-12-05 VITALS
HEIGHT: 60 IN | RESPIRATION RATE: 16 BRPM | HEART RATE: 70 BPM | SYSTOLIC BLOOD PRESSURE: 154 MMHG | WEIGHT: 158.1 LBS | TEMPERATURE: 97.6 F | BODY MASS INDEX: 31.04 KG/M2 | DIASTOLIC BLOOD PRESSURE: 82 MMHG

## 2018-12-05 DIAGNOSIS — I10 ESSENTIAL HYPERTENSION: Primary | Chronic | ICD-10-CM

## 2018-12-05 DIAGNOSIS — E11.8 TYPE 2 DIABETES MELLITUS WITH COMPLICATION, WITHOUT LONG-TERM CURRENT USE OF INSULIN (HCC): ICD-10-CM

## 2018-12-05 PROBLEM — E03.9 ACQUIRED HYPOTHYROIDISM: Chronic | Status: ACTIVE | Noted: 2018-12-05

## 2018-12-05 PROCEDURE — 99214 OFFICE O/P EST MOD 30 MIN: CPT | Performed by: INTERNAL MEDICINE

## 2018-12-05 PROCEDURE — 3017F COLORECTAL CA SCREEN DOC REV: CPT | Performed by: INTERNAL MEDICINE

## 2018-12-05 PROCEDURE — G8427 DOCREV CUR MEDS BY ELIG CLIN: HCPCS | Performed by: INTERNAL MEDICINE

## 2018-12-05 PROCEDURE — G8417 CALC BMI ABV UP PARAM F/U: HCPCS | Performed by: INTERNAL MEDICINE

## 2018-12-05 PROCEDURE — 99212 OFFICE O/P EST SF 10 MIN: CPT | Performed by: INTERNAL MEDICINE

## 2018-12-05 PROCEDURE — G8484 FLU IMMUNIZE NO ADMIN: HCPCS | Performed by: INTERNAL MEDICINE

## 2018-12-05 PROCEDURE — 3044F HG A1C LEVEL LT 7.0%: CPT | Performed by: INTERNAL MEDICINE

## 2018-12-05 PROCEDURE — 1036F TOBACCO NON-USER: CPT | Performed by: INTERNAL MEDICINE

## 2018-12-05 PROCEDURE — 2022F DILAT RTA XM EVC RTNOPTHY: CPT | Performed by: INTERNAL MEDICINE

## 2018-12-05 RX ORDER — GLUCOSAMINE HCL/CHONDROITIN SU 500-400 MG
CAPSULE ORAL
Qty: 120 STRIP | Refills: 3 | Status: SHIPPED | OUTPATIENT
Start: 2018-12-05 | End: 2019-03-19 | Stop reason: SDUPTHER

## 2018-12-05 RX ORDER — LANCETS 33 GAUGE
EACH MISCELLANEOUS
Qty: 100 EACH | Refills: 3 | Status: SHIPPED | OUTPATIENT
Start: 2018-12-05 | End: 2019-03-19 | Stop reason: SDUPTHER

## 2018-12-05 RX ORDER — HYDRALAZINE HYDROCHLORIDE 100 MG/1
100 TABLET, FILM COATED ORAL 3 TIMES DAILY
Qty: 90 TABLET | Refills: 3 | Status: SHIPPED | OUTPATIENT
Start: 2018-12-05 | End: 2019-03-19 | Stop reason: SDUPTHER

## 2018-12-05 ASSESSMENT — ENCOUNTER SYMPTOMS
EYE DISCHARGE: 0
COUGH: 0
NAUSEA: 0
SORE THROAT: 0
CONSTIPATION: 0
VOMITING: 0
ABDOMINAL PAIN: 0
SHORTNESS OF BREATH: 0
DIARRHEA: 0
WHEEZING: 0

## 2019-02-08 DIAGNOSIS — E55.9 VITAMIN D DEFICIENCY: ICD-10-CM

## 2019-02-13 RX ORDER — MELATONIN
Qty: 60 TABLET | Refills: 1 | Status: SHIPPED | OUTPATIENT
Start: 2019-02-13 | End: 2019-03-19 | Stop reason: SDUPTHER

## 2019-03-19 ENCOUNTER — OFFICE VISIT (OUTPATIENT)
Dept: INTERNAL MEDICINE | Age: 59
End: 2019-03-19
Payer: MEDICAID

## 2019-03-19 VITALS
DIASTOLIC BLOOD PRESSURE: 90 MMHG | SYSTOLIC BLOOD PRESSURE: 199 MMHG | WEIGHT: 157.6 LBS | RESPIRATION RATE: 16 BRPM | BODY MASS INDEX: 30.94 KG/M2 | HEART RATE: 61 BPM | HEIGHT: 60 IN | TEMPERATURE: 98.1 F

## 2019-03-19 DIAGNOSIS — E55.9 VITAMIN D DEFICIENCY: ICD-10-CM

## 2019-03-19 DIAGNOSIS — E11.8 TYPE 2 DIABETES MELLITUS WITH COMPLICATION, WITHOUT LONG-TERM CURRENT USE OF INSULIN (HCC): ICD-10-CM

## 2019-03-19 DIAGNOSIS — E03.9 ACQUIRED HYPOTHYROIDISM: Chronic | ICD-10-CM

## 2019-03-19 DIAGNOSIS — M79.10 MUSCLE ACHE: Primary | ICD-10-CM

## 2019-03-19 DIAGNOSIS — E78.2 MIXED HYPERLIPIDEMIA: Chronic | ICD-10-CM

## 2019-03-19 DIAGNOSIS — I10 ESSENTIAL HYPERTENSION: ICD-10-CM

## 2019-03-19 PROCEDURE — 99214 OFFICE O/P EST MOD 30 MIN: CPT | Performed by: INTERNAL MEDICINE

## 2019-03-19 PROCEDURE — G8484 FLU IMMUNIZE NO ADMIN: HCPCS | Performed by: INTERNAL MEDICINE

## 2019-03-19 PROCEDURE — G8427 DOCREV CUR MEDS BY ELIG CLIN: HCPCS | Performed by: INTERNAL MEDICINE

## 2019-03-19 PROCEDURE — 1036F TOBACCO NON-USER: CPT | Performed by: INTERNAL MEDICINE

## 2019-03-19 PROCEDURE — 99213 OFFICE O/P EST LOW 20 MIN: CPT | Performed by: INTERNAL MEDICINE

## 2019-03-19 PROCEDURE — G8417 CALC BMI ABV UP PARAM F/U: HCPCS | Performed by: INTERNAL MEDICINE

## 2019-03-19 PROCEDURE — 2022F DILAT RTA XM EVC RTNOPTHY: CPT | Performed by: INTERNAL MEDICINE

## 2019-03-19 PROCEDURE — 3017F COLORECTAL CA SCREEN DOC REV: CPT | Performed by: INTERNAL MEDICINE

## 2019-03-19 PROCEDURE — 3046F HEMOGLOBIN A1C LEVEL >9.0%: CPT | Performed by: INTERNAL MEDICINE

## 2019-03-19 RX ORDER — HYDROCHLOROTHIAZIDE 25 MG/1
25 TABLET ORAL DAILY
Qty: 30 TABLET | Refills: 3 | Status: SHIPPED | OUTPATIENT
Start: 2019-03-19 | End: 2019-07-17 | Stop reason: SDUPTHER

## 2019-03-19 RX ORDER — ACETAMINOPHEN AND CODEINE PHOSPHATE 300; 30 MG/1; MG/1
1 TABLET ORAL EVERY 4 HOURS PRN
Qty: 18 TABLET | Refills: 0 | Status: SHIPPED | OUTPATIENT
Start: 2019-03-19 | End: 2019-03-19 | Stop reason: CLARIF

## 2019-03-19 RX ORDER — LATANOPROST 50 UG/ML
1 SOLUTION/ DROPS OPHTHALMIC NIGHTLY
Qty: 1 BOTTLE | Refills: 3 | Status: SHIPPED | OUTPATIENT
Start: 2019-03-19 | End: 2019-07-17 | Stop reason: SDUPTHER

## 2019-03-19 RX ORDER — LEVOTHYROXINE SODIUM 0.1 MG/1
100 TABLET ORAL DAILY
Qty: 90 TABLET | Refills: 1 | Status: SHIPPED | OUTPATIENT
Start: 2019-03-19 | End: 2019-07-17 | Stop reason: SDUPTHER

## 2019-03-19 RX ORDER — AMLODIPINE BESYLATE 10 MG/1
10 TABLET ORAL DAILY
Qty: 90 TABLET | Refills: 3 | Status: SHIPPED | OUTPATIENT
Start: 2019-03-19 | End: 2019-07-17 | Stop reason: SDUPTHER

## 2019-03-19 RX ORDER — LISINOPRIL 40 MG/1
40 TABLET ORAL NIGHTLY
Qty: 90 TABLET | Refills: 1 | Status: SHIPPED | OUTPATIENT
Start: 2019-03-19 | End: 2019-07-17 | Stop reason: SDUPTHER

## 2019-03-19 RX ORDER — HYDRALAZINE HYDROCHLORIDE 100 MG/1
100 TABLET, FILM COATED ORAL 3 TIMES DAILY
Qty: 90 TABLET | Refills: 3 | Status: SHIPPED | OUTPATIENT
Start: 2019-03-19 | End: 2019-07-17 | Stop reason: SDUPTHER

## 2019-03-19 RX ORDER — SIMVASTATIN 20 MG
20 TABLET ORAL NIGHTLY
Qty: 90 TABLET | Refills: 1 | Status: SHIPPED | OUTPATIENT
Start: 2019-03-19 | End: 2019-07-17 | Stop reason: SDUPTHER

## 2019-03-19 RX ORDER — ASPIRIN 81 MG/1
81 TABLET ORAL DAILY
Qty: 90 TABLET | Refills: 1 | Status: SHIPPED | OUTPATIENT
Start: 2019-03-19 | End: 2019-07-17 | Stop reason: SDUPTHER

## 2019-03-19 RX ORDER — GLUCOSAMINE HCL/CHONDROITIN SU 500-400 MG
CAPSULE ORAL
Qty: 120 STRIP | Refills: 3 | Status: SHIPPED | OUTPATIENT
Start: 2019-03-19 | End: 2019-07-17 | Stop reason: SDUPTHER

## 2019-03-19 RX ORDER — MELATONIN
Qty: 60 TABLET | Refills: 1 | Status: SHIPPED
Start: 2019-03-19 | End: 2020-10-02 | Stop reason: SDUPTHER

## 2019-03-19 RX ORDER — SPIRONOLACTONE 25 MG/1
25 TABLET ORAL DAILY
Qty: 30 TABLET | Refills: 3 | Status: SHIPPED | OUTPATIENT
Start: 2019-03-19 | End: 2019-04-24

## 2019-03-19 RX ORDER — LIDOCAINE 50 MG/G
1 PATCH TOPICAL DAILY
Qty: 7 PATCH | Refills: 0 | Status: SHIPPED | OUTPATIENT
Start: 2019-03-19 | End: 2019-04-18

## 2019-03-19 RX ORDER — ACETAMINOPHEN 325 MG/1
325 TABLET ORAL EVERY 4 HOURS PRN
Qty: 48 TABLET | Refills: 1 | Status: SHIPPED | OUTPATIENT
Start: 2019-03-19 | End: 2020-01-07 | Stop reason: SDUPTHER

## 2019-03-19 RX ORDER — LANCETS 33 GAUGE
EACH MISCELLANEOUS
Qty: 100 EACH | Refills: 3 | Status: SHIPPED | OUTPATIENT
Start: 2019-03-19 | End: 2019-07-17 | Stop reason: SDUPTHER

## 2019-03-19 ASSESSMENT — PATIENT HEALTH QUESTIONNAIRE - PHQ9
SUM OF ALL RESPONSES TO PHQ QUESTIONS 1-9: 0
SUM OF ALL RESPONSES TO PHQ9 QUESTIONS 1 & 2: 0
2. FEELING DOWN, DEPRESSED OR HOPELESS: 0
SUM OF ALL RESPONSES TO PHQ QUESTIONS 1-9: 0
1. LITTLE INTEREST OR PLEASURE IN DOING THINGS: 0

## 2019-03-20 ASSESSMENT — ENCOUNTER SYMPTOMS
RHINORRHEA: 0
CHEST TIGHTNESS: 0
VOMITING: 0
BLOOD IN STOOL: 0
CONSTIPATION: 0
NAUSEA: 0
ABDOMINAL PAIN: 0
EYE DISCHARGE: 0
WHEEZING: 0
COUGH: 0
DIARRHEA: 0
EYE ITCHING: 0
SHORTNESS OF BREATH: 0
TROUBLE SWALLOWING: 0

## 2019-04-23 ENCOUNTER — HOSPITAL ENCOUNTER (OUTPATIENT)
Age: 59
Discharge: HOME | End: 2019-04-23
Payer: MEDICAID

## 2019-04-23 DIAGNOSIS — I10 ESSENTIAL HYPERTENSION: ICD-10-CM

## 2019-04-23 DIAGNOSIS — E03.9 ACQUIRED HYPOTHYROIDISM: Chronic | ICD-10-CM

## 2019-04-23 LAB
CHOLESTEROL, TOTAL: 199 MG/DL (ref 0–199)
HDLC SERPL-MCNC: 36 MG/DL
LDL CHOLESTEROL CALCULATED: 86 MG/DL (ref 0–99)
TRIGL SERPL-MCNC: 384 MG/DL (ref 0–149)
TSH SERPL DL<=0.05 MIU/L-ACNC: 5.83 UIU/ML (ref 0.27–4.2)
VLDLC SERPL CALC-MCNC: 77 MG/DL

## 2019-04-23 PROCEDURE — 80061 LIPID PANEL: CPT

## 2019-04-23 PROCEDURE — 36415 COLL VENOUS BLD VENIPUNCTURE: CPT

## 2019-04-23 PROCEDURE — 84443 ASSAY THYROID STIM HORMONE: CPT

## 2019-04-24 ENCOUNTER — OFFICE VISIT (OUTPATIENT)
Dept: INTERNAL MEDICINE | Age: 59
End: 2019-04-24
Payer: MEDICAID

## 2019-04-24 VITALS
SYSTOLIC BLOOD PRESSURE: 157 MMHG | DIASTOLIC BLOOD PRESSURE: 82 MMHG | TEMPERATURE: 98.4 F | HEIGHT: 60 IN | RESPIRATION RATE: 16 BRPM | WEIGHT: 156.2 LBS | BODY MASS INDEX: 30.67 KG/M2 | HEART RATE: 71 BPM

## 2019-04-24 DIAGNOSIS — I10 ESSENTIAL HYPERTENSION: ICD-10-CM

## 2019-04-24 PROCEDURE — 3017F COLORECTAL CA SCREEN DOC REV: CPT | Performed by: INTERNAL MEDICINE

## 2019-04-24 PROCEDURE — 99213 OFFICE O/P EST LOW 20 MIN: CPT | Performed by: INTERNAL MEDICINE

## 2019-04-24 PROCEDURE — 1036F TOBACCO NON-USER: CPT | Performed by: INTERNAL MEDICINE

## 2019-04-24 PROCEDURE — 99212 OFFICE O/P EST SF 10 MIN: CPT | Performed by: INTERNAL MEDICINE

## 2019-04-24 PROCEDURE — G8417 CALC BMI ABV UP PARAM F/U: HCPCS | Performed by: INTERNAL MEDICINE

## 2019-04-24 PROCEDURE — G8427 DOCREV CUR MEDS BY ELIG CLIN: HCPCS | Performed by: INTERNAL MEDICINE

## 2019-04-24 RX ORDER — SPIRONOLACTONE 50 MG/1
50 TABLET, FILM COATED ORAL DAILY
Qty: 30 TABLET | Refills: 1 | Status: SHIPPED | OUTPATIENT
Start: 2019-04-24 | End: 2019-07-17 | Stop reason: SDUPTHER

## 2019-04-24 NOTE — PROGRESS NOTES
Arsalan Timmons [facility]  InternalMedicine Residency Program  ACC Note      SUBJECTIVE:  CC: had concerns including Hypertension (1 month fu). Gatito Hartmann presented to the [facility] for a routine visit. She is 59 yo, F, h/o of non-compliance, uncontrolled HTN, Hypothyroidism. Hypertension: Patient here for follow-up of elevated blood pressure. She is not exercising much, though she is starting to exercise. Walk 2h/day, 3/w. Weight 156. and is adherent to low salt diet. Blood pressure is not well controlled at home, however, improve from previously. Log SBP was 145-150 . At office today: 157/82. Cardiac symptoms none. Patient denies chest pain, dyspnea, exertional chest pressure/discomfort and irregular heart beat. Cardiovascular risk factors: obesity (BMI >= 30 kg/m2). Use of agents associated with hypertension: none. History of target organ damage: none. Note TSH improves to 5.8     Review of Systems   Constitutional: Negative for chills, fatigue, fever and unexpected weight change. HENT: Negative for ear discharge, ear pain, hearing loss, postnasal drip, rhinorrhea and trouble swallowing. Eyes: Negative for discharge and itching. Respiratory: Negative for cough, chest tightness, shortness of breath and wheezing. Cardiovascular: Negative for chest pain, palpitations and leg swelling. Gastrointestinal: Negative for abdominal pain, blood in stool, constipation, diarrhea, nausea and vomiting. Genitourinary: Negative for difficulty urinating and flank pain. Skin: Negative for rash. Neurological: Negative for dizziness, weakness, light-headedness, numbness and headaches.        Outpatient Medications Marked as Taking for the 4/24/19 encounter (Office Visit) with Karlene Quinones MD   Medication Sig Dispense Refill    spironolactone (ALDACTONE) 50 MG tablet Take 1 tablet by mouth daily 30 tablet 1    Cholecalciferol (VITAMIN D3) 1000 units TABS TAKE 2 TABLETS BY MOUTH EVERY DAY 60 tablet 1  amLODIPine (NORVASC) 10 MG tablet Take 1 tablet by mouth daily 90 tablet 3    aspirin EC 81 MG EC tablet Take 1 tablet by mouth daily 90 tablet 1    blood glucose monitor strips Check four times daily. OK to substitute per insurance coverage. 120 strip 3    hydrALAZINE (APRESOLINE) 100 MG tablet Take 1 tablet by mouth three times daily 90 tablet 3    hydrochlorothiazide (HYDRODIURIL) 25 MG tablet Take 1 tablet by mouth daily 30 tablet 3    levothyroxine (SYNTHROID) 100 MCG tablet Take 1 tablet by mouth daily 90 tablet 1    lisinopril (PRINIVIL;ZESTRIL) 40 MG tablet Take 1 tablet by mouth nightly 90 tablet 1    metFORMIN (GLUCOPHAGE) 500 MG tablet Take 1 tablet by mouth 2 times daily (with meals) 180 tablet 1    ONETOUCH DELICA LANCETS 92P MISC TEST BLOOD SUGAR FOUR TIMES DAILY 100 each 3    simvastatin (ZOCOR) 20 MG tablet Take 1 tablet by mouth nightly 90 tablet 1    latanoprost (XALATAN) 0.005 % ophthalmic solution Place 1 drop into both eyes nightly 1 Bottle 3    Blood Pressure Monitoring (BLOOD PRESSURE CUFF) MISC 1 each by Does not apply route daily 1 each 0    Misc. Devices MISC One touch glucometer 1 Device 0       I have reviewed all pertinent PMHx, PSHx, FamHx, SocialHx, medications, and allergiesand updated history as appropriate. OBJECTIVE:    VS: BP (!) 157/82   Pulse 71   Temp 98.4 °F (36.9 °C) (Oral)   Resp 16   Ht 5' (1.524 m)   Wt 156 lb 3.2 oz (70.9 kg)   BMI 30.51 kg/m²   Physical Exam   Constitutional: She is oriented to person, place, and time. She appears well-developed and well-nourished. HENT:   Head: Normocephalic and atraumatic. Right Ear: External ear normal.   Left Ear: External ear normal.   Mouth/Throat: Oropharynx is clear and moist.   Eyes: Conjunctivae are normal.   Neck: Normal range of motion. Neck supple. Cardiovascular: Normal rate, regular rhythm, normal heart sounds and intact distal pulses.    Pulmonary/Chest: Effort normal and breath sounds

## 2019-04-24 NOTE — PROGRESS NOTES
[facility]sal[facility] Timmons [facility]  Internal Medicine Clinic    Attending Physician Statement:    I have discussed the case, including pertinent history and exam findings with the resident. I agree with the assessment, plan and orders as documented by the resident. Patient here for routine follow up of medical problems. HTN - still above goal - improving from alst visit but not at goal today - would increase aldactone to 50 and follow up in 1 month    Hypothyroidism - tsh improving to 5.8 with medication complaince in the last few weeks - would check TSH at next visit before making a change given BP    Remainder of medical problems as per resident note.   Mary Ackerman D.O.  4/24/2019 10:53 AM

## 2019-04-24 NOTE — PATIENT INSTRUCTIONS
Patient Education   1. Please take medication regularly  2. We increase Spironolactone from 25mg to 50mg daily   3. Keep doing good job on your exercise, no salt diet, and log your blood pressure   4. We will check your thyroid function next visit      Dieta DASH: Instrucciones de cuidado - [ DASH Diet: Care Instructions ]  Instrucciones de cuidado    La dieta DASH es un plan de alimentación que puede ayudar a bajar la presión arterial. DASH es la sigla en inglés de \"Dietary Approaches to Stop Hypertension\" (medidas dietéticas para disminuir la hipertensión). Hipertensión significa presión arterial oneyda. La dieta DASH se concentra en el consumo de alimentos con alto contenido de calcio, potasio y [place]. Estos nutrientes pueden disminuir la presión arterial. Los alimentos con el mayor contenido de [place] nutrientes son las frutas, las verduras, los productos lácteos de bajo contenido de [place], las nueces, las semillas y las legumbres. Estefanía belen suplementos de calcio, potasio y magnesio, en lugar de comer alimentos ricos en estos nutrientes, no tiene el mismo efecto. La dieta DASH también incluye granos integrales, pescado y aves. La dieta DASH es daniel de los cambios de estilo de bladimir que quizá le recomiende forrest médico para reducir la presión arterial oneyda. Es posible que forrest médico también quiera que usted reduzca la cantidad de sodio en forrest [place] Distad. Reducir el sodio mientras sigue la dieta DASH puede bajar la presión arterial incluso más que únicamente con la dieta DASH. La atención de seguimiento es coral parte clave de forrest tratamiento y seguridad. Asegúrese de hacer y acudir a todas las citas, y llame a forrest médico si está teniendo problemas. También es coral buena idea saber los resultados de matti exámenes y mantener coral lista de los medicamentos que john. ¿Cómo puede cuidarse en el hogar? Cómo seguir la dieta DASH  · Coma entre 4 y 5 porciones de fruta al día.  Coral porción incluye 1 fruta de tamaño mediano, ½ taza de fruta enlatada o cortada en trozos, 1/4 taza de fruta seca o 4 onzas (½ taza) de jugo de frutas. Elija fruta con más frecuencia que jugo. · Consuma entre 4 y 11 porciones de verduras al día. Coral porción incluye 1 taza de Daniella o de verduras de hojas crudas, ½ taza de verduras cocidas o cortadas en trozos, o 4 onzas (½ taza) de jugo de verduras. Elija comer verduras con más frecuencia que belen forrest jugo. · Consuma entre 2 y 3 porciones de lácteos semidescremados y descremados al día. Coral porción incluye 8 onzas de [place], 1 taza de yogur o 1½ onzas de [place]. · Coma entre 6 y 6 porciones de granos todos los [address] Coral porción incluye 1 rebanada de pan, 1 onza de cereal seco, o ½ taza de arroz, pasta o cereal cocido. Trate de elegir productos de grano integral con la mayor frecuencia posible. · Limite la cantidad de [place]), aves y pescado a 2 porciones al día. David Appl porción son 3 onzas, lo que es aproximadamente el tamaño de un geovanna de naipes. · Coma entre 4 y 5 porciones de nueces, semillas y legumbres (frijoles [habichuelas], lentejas y arvejas [chícharos] secos y cocidos) a la semana. Coral porción incluye 1/3 taza de nueces, 2 cucharadas de semillas o ½ taza de frijoles o arvejas cocidos. · [address] y aceites a 2 o 3 porciones al día. Coral porción es 1 cucharadita de aceite vegetal o 2 cucharadas de aderezo para ensaladas. · Limite los dulces y el azúcar adicional a 5 porciones o menos por semana. David Appl porción es 1 cucharada de [place] o [place], ½ taza de sorbete o 1 taza de limonada. · Consuma menos de 2,300 miligramos (mg) de sodio al día. Si limita forrest consumo de sodio a 1,500 mg al día, puede reducir forrest presión arterial aún más. Consejos para tener éxito  · Inicie con cambios pequeños. No intente hacer cambios radicales en forrest dieta de manera repentina. Podría sentir que extraña matti comidas favoritas y, por lo [place], linda coral mayor probabilidad de que no cumpla el plan.  Shelia Deepak y manténgalos. Lyndal Courts que esos cambios se conviertan en un hábito, incorpore algunos [employer]. · Pruebe algo de lo siguiente:  ? Fíjese el objetivo de comer coral porción de fruta o de verduras en todas las comidas y los refrigerios. [place] facilitará alcanzar la cantidad diaria recomendada de frutas y verduras. ? Pruebe comer yogur cubierto con frutas frescas y nueces pollo refrigerio o pollo postre saludable. ? Agregue ramón, tomate, pepino y cebolla a matti sándwiches. ? Combine coral masa de pizza precocida con queso mozzarella de bajo contenido de grasa (\"low-fat\") y cúbralo con abundantes verduras. Intente utilizar tomates, calabaza, espinaca, brócoli, zanahorias, coliflor y cebollas. ? Opte por comer coral variedad de verduras cortadas en trozos con un aderezo de bajo contenido de grasa pollo refrigerio, en lugar de \"chips\" (tipo gunner fritas) y un \"dip\" (producto untable). ? [place] semillas de girasol o almendras picadas [employer]. O intente agregar nueces o almendras picadas a las verduras cocidas. ? Pruebe algunas comidas vegetarianas con frijoles y arvejas. Donnalee Edelson o frijoles rojos a las ensaladas. Prepare burritos y tacos con puré de frijoles pintos o negros. ¿Dónde puede encontrar más información en inglés? Eris [place]field a [email or web address] e ingrese a forrest cuenta de MyChart. Denece Dixiech [id number] en el Ange Ends \"Search Health Information\" para más información (en inglés) sobre \"Dieta DASH: Instrucciones de cuidado - [ DASH Diet: Care Instructions ]. \"     Si no tiene coral cuenta, jerardo matteo en el enlace \"Sign Up Now\". Revisado: 22 julio, 2018  Versión del contenido: 11.9  © 5007-1238 Healthwise, Incorporated. Las instrucciones de cuidado fueron adaptadas bajo licencia por [facility] ([facility]). Si usted tiene Deuel Vickery afección médica o sobre estas instrucciones, siempre pregunte a forrest profesional de ryan.  Healthwise, Incorporated niega toda garantía o responsabilidad por forrest uso de [facility]. Patient Education        Presión arterial oneyda: Instrucciones de cuidado - [ High Blood Pressure: Care Instructions ]  Instrucciones de cuidado    Si forrest presión arterial suele ser superior a 130/80, usted tiene presión arterial oneyda o hipertensión. [place] significa que el número de [place] es 130 o más alto o que el número de abajo es [de-identified] o [address], o ambas cosas. A pesar de lo que mucha gente marsha, la hipertensión no suele causar dolor de anais ni provocar mareos o aturdimiento. Generalmente, no presenta síntomas. Sin embargo, aumenta el riesgo de ataque al corazón, ataque cerebral, y daños en los riñones o en los ojos. A mayor presión arterial, mayor riesgo. Forrest médico le dará coral meta para forrest presión arterial. Forrest meta se basará en forrest ryan y forrest edad. Los cambios de estilo de bladimir, pollo alimentarse en forma saludable y [place], siempre son importantes para ayudarle a bajar la presión arterial. También podría belen medicamentos para lograr forrest meta para la presión arterial.  La atención de seguimiento es coral parte clave de forrest tratamiento y seguridad. Asegúrese de hacer y acudir a todas las citas, y llame a forrest médico si está teniendo problemas. También es coral buena idea saber los resultados de matti exámenes y mantener coral lista de los medicamentos que john. ¿Cómo puede cuidarse en el hogar? Tratamiento médico  · Si leonardo de belen matti medicamentos, forrest presión arterial volverá a subir. Es posible que deba belen un tipo de [place], o más de [place], para reducir la presión arterial. Sea dunia con los medicamentos. [place] forrest medicamento exactamente pollo se lo hayan indicado. Llame a forrest médico si marsha estar teniendo problemas con forrest medicamento. · Hable con forrest médico antes de empezar a belen [facility].  La aspirina puede ayudar a determinadas personas a reducir forrest riesgo de tener un ataque cardíaco o un ataque cerebral. Estefanía belen aspirina no es adecuado para todo el nirmala, debido a que puede causar sangrado grave. · Visite a forerst médico periódicamente. Usted podría necesitar consultar a forrest médico con más frecuencia al principio o hasta que se reduzca forrest presión arterial.  · Si usted está tomando medicamentos para la presión arterial, hable con forrest médico antes de belen medicamentos descongestionantes o antiinflamatorios, pollo ibuprofeno. Algunos de estos medicamentos pueden elevar la presión arterial.  · Aprenda a revisarse la presión arterial en forrest hogar. Cambios en el estilo de bladimir  · Mantenga un peso saludable. [place] es particularmente importante si aumenta de peso en la tenzin de la cintura. Bajar solo 10 libras (4.5 kg) puede ayudarle a reducir forrest presión arterial.  · Rosangela más ejercicios si forrest médico se lo recomienda. Caminar es coral buena opción. Poco a poco, aumente la distancia que [employer]. Intente hacer por lo menos 30 minutos de ejercicio la mayoría de los días de la [place]. También podría nadar, montar en bicicleta o hacer otras actividades. · No tome alcohol o limite la cantidad que monica. Hable con forrest médico acerca de si puede belen alcohol. · Trate de limitar la cantidad de sodio que consume a menos de 2,300 miligramos (mg) al día. Forrest médico podría pedirle que trate de consumir menos de 1,500 mg al día. · Coma abundantes frutas (pollo bananas [plátanos] y naranjas), verduras, legumbres, granos integrales y productos lácteos de bajo contenido de [place]. · Reduzca la cantidad de grasas saturadas en forrest dieta. Los productos derivados de los [place], pollo la [place], el queso y la carne, contienen grasas saturadas. El limitar estos alimentos podría ayudarle a bajar de peso y [place] reducir forrest riesgo de coral enfermedad cardíaca. · No fume. El hábito de fumar aumenta forrest riesgo de ataque cerebral y ataque al corazón. Si necesita ayuda para dejar de fumar, hable con forrest médico sobre programas y medicamentos para dejar de fumar.  [address] superior o el número de abajo es 80 o superior, o ambas cosas.     · Lucia que podría estar teniendo efectos secundarios de los medicamentos para la presión arterial.     · Forrest presión arterial suele ser normal, kaci se eleva por encima de lo normal en al menos 2 ocasiones. ¿Dónde puede encontrar más información en inglés? Eris Whitfield a [email or web address] e ingrese a forrest cuenta de MyChart. Laurada Joelle en el Ange [employer] \"Search Health Information\" para más información (en inglés) sobre \"Presión arterial oneyda: Instrucciones de cuidado - [ High Blood Pressure: Care Instructions ]. \"     Si no tiene coral cuenta, jerardo matteo en el enlace \"Sign Up Now\". Revisado: 22 julio, 2018  Versión del contenido: 11.9  © 5458-8930 Healthwise, Incorporated. Las instrucciones de cuidado fueron adaptadas bajo licencia por [facility] ([facility]). Si usted tiene [place] afección médica o sobre estas instrucciones, siempre pregunte a forrest profesional de ryan. Healthwise, Incorporated niega toda garantía o responsabilidad por forrest uso de esta información.

## 2019-04-25 ASSESSMENT — ENCOUNTER SYMPTOMS
TROUBLE SWALLOWING: 0
CONSTIPATION: 0
SHORTNESS OF BREATH: 0
NAUSEA: 0
RHINORRHEA: 0
BLOOD IN STOOL: 0
CHEST TIGHTNESS: 0
VOMITING: 0
DIARRHEA: 0
WHEEZING: 0
COUGH: 0
EYE ITCHING: 0
EYE DISCHARGE: 0
ABDOMINAL PAIN: 0

## 2019-05-28 ENCOUNTER — OFFICE VISIT (OUTPATIENT)
Dept: INTERNAL MEDICINE | Age: 59
End: 2019-05-28
Payer: MEDICAID

## 2019-05-28 VITALS
TEMPERATURE: 98.6 F | RESPIRATION RATE: 16 BRPM | WEIGHT: 157.9 LBS | HEIGHT: 60 IN | HEART RATE: 84 BPM | SYSTOLIC BLOOD PRESSURE: 118 MMHG | BODY MASS INDEX: 31 KG/M2 | DIASTOLIC BLOOD PRESSURE: 70 MMHG

## 2019-05-28 DIAGNOSIS — R73.03 PRE-DIABETES: ICD-10-CM

## 2019-05-28 DIAGNOSIS — I10 ESSENTIAL HYPERTENSION: Primary | ICD-10-CM

## 2019-05-28 DIAGNOSIS — E03.9 ACQUIRED HYPOTHYROIDISM: Chronic | ICD-10-CM

## 2019-05-28 PROCEDURE — 99212 OFFICE O/P EST SF 10 MIN: CPT | Performed by: INTERNAL MEDICINE

## 2019-05-28 PROCEDURE — G8417 CALC BMI ABV UP PARAM F/U: HCPCS | Performed by: INTERNAL MEDICINE

## 2019-05-28 PROCEDURE — 1036F TOBACCO NON-USER: CPT | Performed by: INTERNAL MEDICINE

## 2019-05-28 PROCEDURE — G8427 DOCREV CUR MEDS BY ELIG CLIN: HCPCS | Performed by: INTERNAL MEDICINE

## 2019-05-28 PROCEDURE — 99213 OFFICE O/P EST LOW 20 MIN: CPT | Performed by: INTERNAL MEDICINE

## 2019-05-28 PROCEDURE — 3017F COLORECTAL CA SCREEN DOC REV: CPT | Performed by: INTERNAL MEDICINE

## 2019-05-28 ASSESSMENT — ENCOUNTER SYMPTOMS
WHEEZING: 0
NAUSEA: 0
VOMITING: 0
EYES NEGATIVE: 1
COUGH: 0
ABDOMINAL PAIN: 0
CONSTIPATION: 0
DIARRHEA: 0
BACK PAIN: 0

## 2019-05-28 NOTE — PROGRESS NOTES
Arsal[facility] Timmons [facility]  Internal Medicine Clinic    Attending Physician Statement:    I have discussed the case, including pertinent history and exam findings with the resident. I agree with the assessment, plan and orders as documented by the resident. Patient here for routine follow up of medical problems. HTN - recent increase in aldactone - BP controlled. Needs BMP, microalb, and TSH checked at next visit    Remainder of medical problems as per resident note.   ePtra Spencer D.O.  5/28/2019 8:50 AM

## 2019-05-28 NOTE — PROGRESS NOTES
SUBJECTIVE:    Chief Complaint   Patient presents with    1 Month Follow-Up    Hypertension       HPI:Cecilia Hoover presented to the [facility] for BP follow up. Past Medical History:   Diagnosis Date    Accelerated hypertension 8/8/2012    Diabetes mellitus ([address]     Glaucoma     Hyperlipidemia     Hypertension     Hypothyroidism     Intracranial aneurysm 8/8/2012    Tobacco abuse 8/8/2012    quit     's number: [id number]    The patient came in for BP follow-up with recent titration of her aldactone. The patient does not have a BP cuff at home and wants to have one at home. She said that she is very complaint at home. The patient is doing well. Denies blurry vision, chest pain, palpitations, SOB, no SOB on exertion, no change in bowel movement or urination. She can walk for 2 hours. No tingling and numbness. She does not have history for CHF. She checked her fasting glucose this AM: 176 mg/dL. She said she checked 4 times a time. Average fast blood glucose: 125 mg/dL   Average post-meal glucose: 180 mg/dL    Review Of Systems:  Review of Systems   Constitutional: Negative for appetite change, chills, fatigue and fever. HENT: Negative. Eyes: Negative. Respiratory: Negative for cough and wheezing. Cardiovascular: Negative for chest pain, palpitations and leg swelling. Gastrointestinal: Negative for abdominal pain, constipation, diarrhea, nausea and vomiting. Endocrine: Negative for polyuria. Genitourinary: Negative for difficulty urinating and dysuria. Musculoskeletal: Negative for back pain. Skin: Negative. Neurological: Negative for syncope, weakness, light-headedness and headaches. Psychiatric/Behavioral: Negative.           Current Outpatient Medications:     spironolactone (ALDACTONE) 50 MG tablet, Take 1 tablet by mouth daily, Disp: 30 tablet, Rfl: 1    Cholecalciferol (VITAMIN D3) 1000 units TABS, TAKE 2 TABLETS BY MOUTH EVERY DAY, Disp: 60 tablet, Rfl: 1    amLODIPine (NORVASC) 10 MG tablet, Take 1 tablet by mouth daily, Disp: 90 tablet, Rfl: 3    aspirin EC 81 MG EC tablet, Take 1 tablet by mouth daily, Disp: 90 tablet, Rfl: 1    blood glucose monitor strips, Check four times daily. OK to substitute per insurance coverage. , Disp: 120 strip, Rfl: 3    hydrALAZINE (APRESOLINE) 100 MG tablet, Take 1 tablet by mouth three times daily, Disp: 90 tablet, Rfl: 3    hydrochlorothiazide (HYDRODIURIL) 25 MG tablet, Take 1 tablet by mouth daily, Disp: 30 tablet, Rfl: 3    levothyroxine (SYNTHROID) 100 MCG tablet, Take 1 tablet by mouth daily, Disp: 90 tablet, Rfl: 1    lisinopril (PRINIVIL;ZESTRIL) 40 MG tablet, Take 1 tablet by mouth nightly, Disp: 90 tablet, Rfl: 1    metFORMIN (GLUCOPHAGE) 500 MG tablet, Take 1 tablet by mouth 2 times daily (with meals), Disp: 180 tablet, Rfl: 1    ONETOUCH DELICA LANCETS 65S MISC, TEST BLOOD SUGAR FOUR TIMES DAILY, Disp: 100 each, Rfl: 3    simvastatin (ZOCOR) 20 MG tablet, Take 1 tablet by mouth nightly, Disp: 90 tablet, Rfl: 1    latanoprost (XALATAN) 0.005 % ophthalmic solution, Place 1 drop into both eyes nightly, Disp: 1 Bottle, Rfl: 3    acetaminophen (AMINOFEN) 325 MG tablet, Take 1 tablet by mouth every 4 hours as needed for Pain, Disp: 48 tablet, Rfl: 1    Cholecalciferol (VITAMIN D3) 1000 units TABS, Take 2 tablets by mouth daily, Disp: 90 tablet, Rfl: 2    Blood Pressure Monitoring (BLOOD PRESSURE CUFF) MISC, 1 each by Does not apply route daily, Disp: 1 each, Rfl: 0    Misc. Devices MISC, One touch glucometer, Disp: 1 Device, Rfl: 0    zoster recombinant adjuvanted vaccine (SHINGRIX) 50 MCG SUSR injection, 50 MCG IM then repeat 2-6 months., Disp: 0.5 mL, Rfl: 1    OBJECTIVE:    VS: /70   Pulse 84   Temp 98.6 °F (37 °C) (Oral)   Resp 16   Ht 5' (1.524 m)   Wt 157 lb 14.4 oz (71.6 kg)   BMI 30.84 kg/m²   Physical Exam   Constitutional: She is oriented to person, place, and time. No distress. HENT:   Head: Normocephalic and atraumatic. Eyes: Pupils are equal, round, and reactive to light. Conjunctivae and EOM are normal.   Neck: Normal range of motion. Neck supple. No JVD present. No thyromegaly present. Cardiovascular: Normal rate, regular rhythm and normal heart sounds. Exam reveals no gallop and no friction rub. No murmur heard. Pulmonary/Chest: Effort normal and breath sounds normal. No respiratory distress. She has no wheezes. She has no rales. Abdominal: Soft. Bowel sounds are normal. She exhibits no distension and no mass. There is no tenderness. Musculoskeletal: Normal range of motion. She exhibits no edema or deformity. Neurological: She is alert and oriented to person, place, and time. She has normal reflexes. No cranial nerve deficit. ASSESSMENT/PLAN:    Patient Active Problem List    Diagnosis Date Noted    Acquired hypothyroidism 12/05/2018    Overweight (BMI 25.0-29.9) 11/18/2016    Diverticulosis of colon 08/05/2014    Lipoma of colon 08/05/2014    Constipation 07/10/2014    Acquired hypothyroidism     Hyperlipidemia     S/P craniotomy 02/07/2014    German speaking patient 02/07/2014    Type 2 diabetes mellitus with complication, without long-term current use of insulin ([facility] [address] 09/18/2013    Hypertension 08/24/2012    H/O medication noncompliance 08/08/2012     1. HTN  Cont current meds  Will order BP cuff - or advise the patient to buy over the counter. BP well controlled  Goal < 130/80 mmHg  Check BMP for kidney functions and K    2. Pre-DM  A1c highest 5.9%  On metformin  Annual DM screen from 44 is advised in her case    3.  Hypothyroidism  Compliant  Check TSH   Cont current dose     RTC: cont to f/u with PCP     I have reviewed my findings and recommendations with Joanne Bradshaw MD PGY-2    5/28/2019 8:37 AM

## 2019-05-28 NOTE — PROGRESS NOTES
[id number] used for rooming. Discharge instructions reviewed with patient per Dr. Ssuu Hoskins. Patient directed to  to  AVS and schedule follow up appt.

## 2019-05-28 NOTE — PATIENT INSTRUCTIONS
Patient Education   1. F/u DASH diet  2. Complete all labs before next visit. DASH Diet: Care Instructions  Your Care Instructions    The DASH diet is an eating plan that can help lower your blood pressure. DASH stands for Dietary Approaches to Stop Hypertension. Hypertension is high blood pressure. The DASH diet focuses on eating foods that are high in calcium, potassium, and magnesium. These nutrients can lower blood pressure. The foods that are highest in these nutrients are fruits, vegetables, low-fat dairy products, nuts, seeds, and legumes. But taking calcium, potassium, and magnesium supplements instead of eating foods that are high in those nutrients does not have the same effect. The DASH diet also includes whole grains, fish, and poultry. The DASH diet is one of several lifestyle changes your doctor may recommend to lower your high blood pressure. Your doctor may also want you to decrease the amount of sodium in your diet. Lowering sodium while following the DASH diet can lower blood pressure even further than just the DASH diet alone. Follow-up care is a key part of your treatment and safety. Be sure to make and go to all appointments, and call your doctor if you are having problems. It's also a good idea to know your test results and keep a list of the medicines you take. How can you care for yourself at home? Following the DASH diet  · Eat 4 to 5 servings of fruit each day. A serving is 1 medium-sized piece of fruit, ½ cup chopped or canned fruit, 1/4 cup dried fruit, or 4 ounces (½ cup) of fruit juice. Choose fruit more often than fruit juice. · Eat 4 to 5 servings of vegetables each day. A serving is 1 cup of lettuce or raw leafy vegetables, ½ cup of chopped or cooked vegetables, or 4 ounces (½ cup) of vegetable juice. Choose vegetables more often than vegetable juice. · Get 2 to 3 servings of low-fat and fat-free dairy each day.  A serving is 8 ounces of milk, 1 cup of yogurt, or 1 ½ ounces of cheese. · Eat 6 to 8 servings of grains each day. A serving is 1 slice of bread, 1 ounce of dry cereal, or ½ cup of cooked rice, pasta, or cooked cereal. Try to choose whole-grain products as much as possible. · Limit lean meat, poultry, and fish to 2 servings each day. A serving is 3 ounces, about the size of a deck of cards. · Eat 4 to 5 servings of nuts, seeds, and legumes (cooked dried beans, lentils, and split peas) each week. A serving is 1/3 cup of nuts, 2 tablespoons of seeds, or ½ cup of cooked beans or peas. · Limit fats and oils to 2 to 3 servings each day. A serving is 1 teaspoon of vegetable oil or 2 tablespoons of salad dressing. · Limit sweets and added sugars to 5 servings or less a week. A serving is 1 tablespoon jelly or jam, ½ cup sorbet, or 1 cup of lemonade. · Eat less than 2,300 milligrams (mg) of sodium a day. If you limit your sodium to 1,500 mg a day, you can lower your blood pressure even more. Tips for success  · Start small. Do not try to make dramatic changes to your diet all at once. You might feel that you are missing out on your favorite foods and then be more likely to not follow the plan. Make small changes, and stick with them. Once those changes become habit, add a few more changes. · Try some of the following:  ? Make it a goal to eat a fruit or vegetable at every meal and at snacks. This will make it easy to get the recommended amount of fruits and vegetables each day. ? Try yogurt topped with fruit and nuts for a snack or healthy dessert. ? Add lettuce, tomato, cucumber, and onion to sandwiches. ? Combine a ready-made pizza crust with low-fat mozzarella cheese and lots of vegetable toppings. Try using tomatoes, squash, spinach, broccoli, carrots, cauliflower, and onions. ? Have a variety of cut-up vegetables with a low-fat dip as an appetizer instead of chips and dip. ? Sprinkle sunflower seeds or chopped almonds over salads.  Or try adding chopped walnuts or almonds to cooked vegetables. ? Try some vegetarian meals using beans and peas. Add garbanzo or kidney beans to salads. Make burritos and tacos with mashed olivares beans or black beans. Where can you learn more? Go to [email or web address].[email or web address] org and sign in to your HelloBooks account. Enter A815 in the Thinkspeed box to learn more about \"DASH Diet: Care Instructions. \"     If you do not have an account, please click on the \"Sign Up Now\" link. Current as of: July 22, 2018  Content Version: 12.0  © 8465-7137 Healthwise, Incorporated. Care instructions adapted under license by [facility] ([facility]). If you have questions about a medical condition or this instruction, always ask your healthcare professional. [address] any warranty or liability for your use of this information.

## 2019-07-12 ENCOUNTER — HOSPITAL ENCOUNTER (OUTPATIENT)
Age: 59
Discharge: HOME | End: 2019-07-12
Payer: MEDICAID

## 2019-07-12 DIAGNOSIS — I10 ESSENTIAL HYPERTENSION: ICD-10-CM

## 2019-07-12 LAB
ANION GAP SERPL CALCULATED.3IONS-SCNC: 13 MMOL/L (ref 7–16)
BUN BLDV-MCNC: 10 MG/DL (ref 6–20)
CALCIUM SERPL-MCNC: 9.7 MG/DL (ref 8.6–10.2)
CHLORIDE BLD-SCNC: 99 MMOL/L (ref 98–107)
CO2: 27 MMOL/L (ref 22–29)
CREAT SERPL-MCNC: 0.8 MG/DL (ref 0.5–1)
CREATININE URINE: 260 MG/DL (ref 29–226)
GFR AFRICAN AMERICAN: >60
GFR NON-AFRICAN AMERICAN: >60 ML/MIN/1.73
GLUCOSE BLD-MCNC: 135 MG/DL (ref 74–99)
MICROALBUMIN UR-MCNC: 15.7 MG/L
MICROALBUMIN/CREAT UR-RTO: 6 (ref 0–30)
POTASSIUM SERPL-SCNC: 4 MMOL/L (ref 3.5–5)
SODIUM BLD-SCNC: 139 MMOL/L (ref 132–146)

## 2019-07-12 PROCEDURE — 80048 BASIC METABOLIC PNL TOTAL CA: CPT

## 2019-07-12 PROCEDURE — 36415 COLL VENOUS BLD VENIPUNCTURE: CPT

## 2019-07-12 PROCEDURE — 82570 ASSAY OF URINE CREATININE: CPT

## 2019-07-12 PROCEDURE — 82044 UR ALBUMIN SEMIQUANTITATIVE: CPT

## 2019-07-17 ENCOUNTER — OFFICE VISIT (OUTPATIENT)
Dept: INTERNAL MEDICINE | Age: 59
End: 2019-07-17
Payer: MEDICAID

## 2019-07-17 VITALS
WEIGHT: 159.5 LBS | SYSTOLIC BLOOD PRESSURE: 102 MMHG | BODY MASS INDEX: 31.31 KG/M2 | TEMPERATURE: 97.9 F | OXYGEN SATURATION: 97 % | HEIGHT: 60 IN | HEART RATE: 72 BPM | DIASTOLIC BLOOD PRESSURE: 58 MMHG | RESPIRATION RATE: 18 BRPM

## 2019-07-17 DIAGNOSIS — E78.2 MIXED HYPERLIPIDEMIA: Chronic | ICD-10-CM

## 2019-07-17 DIAGNOSIS — E55.9 VITAMIN D DEFICIENCY: ICD-10-CM

## 2019-07-17 DIAGNOSIS — I10 ESSENTIAL HYPERTENSION: ICD-10-CM

## 2019-07-17 DIAGNOSIS — E11.8 TYPE 2 DIABETES MELLITUS WITH COMPLICATION, WITHOUT LONG-TERM CURRENT USE OF INSULIN (HCC): ICD-10-CM

## 2019-07-17 DIAGNOSIS — E03.9 ACQUIRED HYPOTHYROIDISM: Chronic | ICD-10-CM

## 2019-07-17 PROCEDURE — 1036F TOBACCO NON-USER: CPT | Performed by: INTERNAL MEDICINE

## 2019-07-17 PROCEDURE — G8417 CALC BMI ABV UP PARAM F/U: HCPCS | Performed by: INTERNAL MEDICINE

## 2019-07-17 PROCEDURE — 99212 OFFICE O/P EST SF 10 MIN: CPT | Performed by: INTERNAL MEDICINE

## 2019-07-17 PROCEDURE — 99213 OFFICE O/P EST LOW 20 MIN: CPT | Performed by: INTERNAL MEDICINE

## 2019-07-17 PROCEDURE — 3046F HEMOGLOBIN A1C LEVEL >9.0%: CPT | Performed by: INTERNAL MEDICINE

## 2019-07-17 PROCEDURE — 2022F DILAT RTA XM EVC RTNOPTHY: CPT | Performed by: INTERNAL MEDICINE

## 2019-07-17 PROCEDURE — 3017F COLORECTAL CA SCREEN DOC REV: CPT | Performed by: INTERNAL MEDICINE

## 2019-07-17 PROCEDURE — G8427 DOCREV CUR MEDS BY ELIG CLIN: HCPCS | Performed by: INTERNAL MEDICINE

## 2019-07-17 RX ORDER — LANCETS 33 GAUGE
EACH MISCELLANEOUS
Qty: 100 EACH | Refills: 3 | Status: SHIPPED | OUTPATIENT
Start: 2019-07-17 | End: 2020-01-07 | Stop reason: SDUPTHER

## 2019-07-17 RX ORDER — GLUCOSAMINE HCL/CHONDROITIN SU 500-400 MG
CAPSULE ORAL
Qty: 120 STRIP | Refills: 3 | Status: SHIPPED | OUTPATIENT
Start: 2019-07-17 | End: 2020-01-07 | Stop reason: SDUPTHER

## 2019-07-17 RX ORDER — MELATONIN
2 DAILY
Qty: 60 TABLET | Refills: 3 | Status: SHIPPED | OUTPATIENT
Start: 2019-07-17 | End: 2020-01-07 | Stop reason: SDUPTHER

## 2019-07-17 RX ORDER — HYDRALAZINE HYDROCHLORIDE 100 MG/1
100 TABLET, FILM COATED ORAL 3 TIMES DAILY
Qty: 90 TABLET | Refills: 3 | Status: SHIPPED | OUTPATIENT
Start: 2019-07-17 | End: 2020-01-07 | Stop reason: SDUPTHER

## 2019-07-17 RX ORDER — LEVOTHYROXINE SODIUM 0.1 MG/1
100 TABLET ORAL DAILY
Qty: 30 TABLET | Refills: 3 | Status: SHIPPED | OUTPATIENT
Start: 2019-07-17 | End: 2020-01-07 | Stop reason: SDUPTHER

## 2019-07-17 RX ORDER — MELATONIN
Qty: 180 TABLET | Status: CANCELLED | OUTPATIENT
Start: 2019-07-17

## 2019-07-17 RX ORDER — AMLODIPINE BESYLATE 10 MG/1
10 TABLET ORAL DAILY
Qty: 30 TABLET | Refills: 3 | Status: SHIPPED | OUTPATIENT
Start: 2019-07-17 | End: 2020-01-07 | Stop reason: SDUPTHER

## 2019-07-17 RX ORDER — SPIRONOLACTONE 50 MG/1
50 TABLET, FILM COATED ORAL DAILY
Qty: 30 TABLET | Refills: 3 | Status: SHIPPED | OUTPATIENT
Start: 2019-07-17 | End: 2020-01-07 | Stop reason: SDUPTHER

## 2019-07-17 RX ORDER — ACETAMINOPHEN 325 MG/1
325 TABLET ORAL EVERY 4 HOURS PRN
Qty: 48 TABLET | Status: CANCELLED | OUTPATIENT
Start: 2019-07-17 | End: 2019-09-25

## 2019-07-17 RX ORDER — HYDROCHLOROTHIAZIDE 25 MG/1
25 TABLET ORAL DAILY
Qty: 30 TABLET | Refills: 3 | Status: SHIPPED | OUTPATIENT
Start: 2019-07-17 | End: 2019-12-12 | Stop reason: SDUPTHER

## 2019-07-17 RX ORDER — LATANOPROST 50 UG/ML
1 SOLUTION/ DROPS OPHTHALMIC NIGHTLY
Qty: 1 BOTTLE | Refills: 3 | Status: CANCELLED | OUTPATIENT
Start: 2019-07-17

## 2019-07-17 RX ORDER — ASPIRIN 81 MG/1
81 TABLET ORAL DAILY
Qty: 30 TABLET | Refills: 3 | Status: SHIPPED | OUTPATIENT
Start: 2019-07-17 | End: 2020-01-07 | Stop reason: SDUPTHER

## 2019-07-17 RX ORDER — LATANOPROST 50 UG/ML
1 SOLUTION/ DROPS OPHTHALMIC NIGHTLY
Qty: 1 BOTTLE | Refills: 1 | Status: SHIPPED | OUTPATIENT
Start: 2019-07-17

## 2019-07-17 RX ORDER — ADHESIVE BANDAGE 3/4"
BANDAGE TOPICAL
Qty: 1 EACH | Refills: 0 | Status: SHIPPED | OUTPATIENT
Start: 2019-07-17 | End: 2020-01-07 | Stop reason: SDUPTHER

## 2019-07-17 RX ORDER — SIMVASTATIN 20 MG
20 TABLET ORAL NIGHTLY
Qty: 30 TABLET | Refills: 3 | Status: SHIPPED | OUTPATIENT
Start: 2019-07-17 | End: 2020-01-07 | Stop reason: SDUPTHER

## 2019-07-17 RX ORDER — LISINOPRIL 40 MG/1
40 TABLET ORAL NIGHTLY
Qty: 30 TABLET | Refills: 3 | Status: SHIPPED | OUTPATIENT
Start: 2019-07-17 | End: 2020-01-07 | Stop reason: SDUPTHER

## 2019-07-17 ASSESSMENT — ENCOUNTER SYMPTOMS
TROUBLE SWALLOWING: 0
COUGH: 0
WHEEZING: 0
BLOOD IN STOOL: 0
ABDOMINAL PAIN: 0
NAUSEA: 0
CONSTIPATION: 0
CHEST TIGHTNESS: 0
DIARRHEA: 0
VOMITING: 0
SHORTNESS OF BREATH: 0
EYE ITCHING: 0
RHINORRHEA: 0
EYE DISCHARGE: 0

## 2019-07-17 NOTE — PROGRESS NOTES
loss, postnasal drip, rhinorrhea and trouble swallowing. Eyes: Negative for discharge and itching. Respiratory: Negative for cough, chest tightness, shortness of breath and wheezing. Cardiovascular: Negative for chest pain, palpitations and leg swelling. Gastrointestinal: Negative for abdominal pain, blood in stool, constipation, diarrhea, nausea and vomiting. Genitourinary: Negative for difficulty urinating and flank pain. Skin: Negative for rash. Neurological: Negative for dizziness, weakness, light-headedness, numbness and headaches. Outpatient Medications Marked as Taking for the 7/17/19 encounter (Office Visit) with Jacqueline Light MD   Medication Sig Dispense Refill    aspirin EC 81 MG EC tablet Take 1 tablet by mouth daily 30 tablet 3    blood glucose monitor strips Check four times daily. OK to substitute per insurance coverage.  120 strip 3    hydrochlorothiazide (HYDRODIURIL) 25 MG tablet Take 1 tablet by mouth daily 30 tablet 3    levothyroxine (SYNTHROID) 100 MCG tablet Take 1 tablet by mouth daily 30 tablet 3    lisinopril (PRINIVIL;ZESTRIL) 40 MG tablet Take 1 tablet by mouth nightly 30 tablet 3    metFORMIN (GLUCOPHAGE) 500 MG tablet Take 1 tablet by mouth 2 times daily (with meals) 180 tablet 3    ONETOUCH DELICA LANCETS 07C MISC TEST BLOOD SUGAR FOUR TIMES DAILY 100 each 3    simvastatin (ZOCOR) 20 MG tablet Take 1 tablet by mouth nightly 30 tablet 3    spironolactone (ALDACTONE) 50 MG tablet Take 1 tablet by mouth daily 30 tablet 3    amLODIPine (NORVASC) 10 MG tablet Take 1 tablet by mouth daily 30 tablet 3    hydrALAZINE (APRESOLINE) 100 MG tablet Take 1 tablet by mouth three times daily 90 tablet 3    Cholecalciferol (VITAMIN D3) 1000 units TABS Take 2 tablets by mouth daily 60 tablet 3    latanoprost (XALATAN) 0.005 % ophthalmic solution Place 1 drop into both eyes nightly 1 Bottle 1    Blood Pressure Monitoring (BLOOD PRESSURE CUFF) MISC Dx:  Hypertension with daily  Dispense: 30 tablet; Refill: 3  - hydrALAZINE (APRESOLINE) 100 MG tablet; Take 1 tablet by mouth three times daily  Dispense: 90 tablet; Refill: 3  - Misc. Devices MISC; Monitor Blood Pressure daily for Hypertension  Dispense: 1 Device; Refill: 0    3. Type 2 diabetes mellitus with complication, without long-term current use of insulin (HCC)  - aspirin EC 81 MG EC tablet; Take 1 tablet by mouth daily  Dispense: 30 tablet; Refill: 3  - blood glucose monitor strips; Check four times daily. OK to substitute per insurance coverage. Dispense: 120 strip; Refill: 3  - metFORMIN (GLUCOPHAGE) 500 MG tablet; Take 1 tablet by mouth 2 times daily (with meals)  Dispense: 180 tablet; Refill: 3  - ONETOUCH DELICA LANCETS 05U MISC; TEST BLOOD SUGAR FOUR TIMES DAILY  Dispense: 100 each; Refill: 3  - HEMOGLOBIN A1C; Future    4. Acquired hypothyroidism  - levothyroxine (SYNTHROID) 100 MCG tablet; Take 1 tablet by mouth daily  Dispense: 30 tablet; Refill: 3  - will check TSH next visit     5. Mixed hyperlipidemia  - simvastatin (ZOCOR) 20 MG tablet; Take 1 tablet by mouth nightly  Dispense: 30 tablet;  Refill: 3    I have reviewed my findings and recommendations with Kinga Nelson and Dr Eligio Vo MD PGY-2  7/17/2019 7:15 PM

## 2019-07-17 NOTE — PATIENT INSTRUCTIONS
cifras en forrest libro de registro, junto con la fecha y la hora. ¿Qué más debería saber usted acerca de la prueba? Es más preciso belen la media de varias lecturas realizadas a lo ortega del día que confiar en coral leo Duron Stamp. Es normal que la presión arterial suba y baje a lo ortega del día. La atención de seguimiento es coral parte clave de forrest tratamiento y seguridad. Asegúrese de hacer y acudir a todas las citas, y llame a forrest médico si está teniendo problemas. También es coral buena idea saber los resultados de los exámenes y mantener coral lista de los medicamentos que john. ¿Dónde puede encontrar más información en inglés? Ladi Guzmán a [email or web address] e ingrese a forrest cuenta de Jaleel Stinson B705 en el [employer]  \"Search Health Information\" para más información (en inglés) sobre \"Prueba casera de presión arterial: Acerca de esta prueba - [ Home Blood Pressure Test: About This Test ]. \"     Si no tiene coral cuenta, jerardo matteo en el enlace \"Sign Up Now\". Revisado: 22 julio, 2018  Versión del contenido: 12.0  © 1624-3054 Healthwise, Incorporated. Las instrucciones de cuidado fueron adaptadas bajo licencia por [facility] ([facility]). Si usted tiene Jermyn Cromwell afección médica o sobre estas instrucciones, siempre pregunte a forrest profesional de ryan. Healthwise, Incorporated niega toda garantía o responsabilidad por forrest uso de esta información. Patient Education        Presión arterial elevada: Instrucciones de cuidado - [ Elevated Blood Pressure: Care Instructions ]  Instrucciones de cuidado    La presión arterial es coral medida de la fuerza que ejerce la julien contra las orourke de las arterias. Es normal que la presión arterial suba y baje a lo ortega del día. Estefanía si se mantiene oneyda por un tiempo, usted tiene presión arterial oneyda. Dos números indican forrest presión arterial. El primer número es la presión sistólica. Muestra qué tan sahra presiona la julien cuando el corazón está bombeando.  El Elayne número es la presión diastólica. Muestra qué tan sahra presiona la [employer] latidos, cuando el corazón está relajado y llenándose de Jeff. Maurene Dimmer arterial ideal para adultos es menos de 120/80 (diga \"120 sobre 80\"). La presión arterial oneyda es de 140/90 o superior. Usted tiene la presión arterial oneyda si el número de [place] es 140 o superior o el número de abajo es 90 o superior, o ambas cosas. La prueba principal para la presión arterial oneyda es simple, rápida e indolora. Para diagnosticar presión arterial oneyda, forrest médico examinará forrest presión arterial en momentos diferentes. Después de tomarle la presión, es posible que forrest médico le pida que se vuelva a belen la presión en casa. Si se le diagnostica presión arterial oneyda, puede colaborar con forrest médico para elaborar un plan a ortega plazo para manejarla. La atención de seguimiento es coral parte clave de forrest tratamiento y seguridad. Asegúrese de hacer y acudir a todas las citas, y llame a forrest médico si está teniendo problemas. También es coral buena idea saber los resultados de matti exámenes y mantener coral lista de los medicamentos que john. ¿Cómo puede cuidarse en el hogar? · No fume. Fumar aumenta forrest riesgo de ataque cerebral y ataque al corazón. Si necesita ayuda para dejarlo, hable con forrest médico sobre programas y medicamentos para dejar de fumar. Estos pueden aumentar matti probabilidades de dejar de fumar para siempre. · Mantenga un peso saludable. · Trate de limitar la cantidad de sodio que ingiere a menos de 2,300 miligramos (mg) al día. Forrest médico podría pedirle que trate de ingerir menos de 1,500 mg al día. · Manténgase físicamente activo. Rosangela al menos 30 minutos de ejercicio la mayoría de los días de la [place]. Caminar es coral buena opción. Es posible que también quiera hacer otras actividades, pollo correr, nadar, [employer], o practicar tenis o deportes de equipo. · No tome alcohol o limite la cantidad que monica.  Hable con forrest médico contener [employer]. ? Sazone matti comidas con ajo, jugo de becky, cebolla, vinagre, hierbas y especias en lugar de sal. No use salsa de soya, salsa para tabatha, sal de cebolla, sal de ajo, mostaza ni ketchup (salsa de tomate) en matti alimentos. ? Use menos sal (o nada) de lo que indique la receta. Por lo general, puede añadir la mitad de la cantidad de joselo que pide la receta sin que la comida pierda el sabor. · Manténgase activo físicamente. Jerardo por lo menos 30 minutos de ejercicio la mayoría de los días de la [place]. Caminar es coral buena opción. Cher Nye desee hacer otras actividades, pollo correr, nadar, [employer], jugar al tenis o practicar otros deportes de equipo. · Limite el alcohol a 2 bebidas al día para los hombres y 1 bebida al día para las mujeres. · Coma muchas frutas, verduras y productos lácteos bajos en grasa. Coma menos grasas saturadas y grasas totales. ¿Cómo se trata la presión arterial oneyda? · Forrest médico sugerirá que jerardo cambios en forrest estilo de bladimir para ayudar al corazón. Por ejemplo, forrest médico podría pedirle que coma alimentos saludables, deje de fumar, baje el peso que tenga de [facility] orleelia y [address]. · Si los cambios de [facility] de bladimir no [place] branch, forrest médico podría recomendarle que tome medicamentos. · Cuando la presión arterial es muy oneyda, se necesitan medicamentos para bajarla. La atención de seguimiento es coral parte clave de forrest tratamiento y seguridad. Asegúrese de hacer y acudir a todas las citas, y llame a forrest médico si está teniendo problemas. También es coral buena idea saber los resultados de matti exámenes y mantener coral lista de los medicamentos que john. ¿Dónde puede encontrar más información en inglés? Chery Caldwell a [email or web address] e ingrese a forrest cuenta de MyChart.  Karli Rowley P501 en el Alvira Frater \"Search Health Information\" para más información (en inglés) sobre \"Aprenda sobre la presión arterial oneyda - [ Learning About High Blood Pressure ]. \"     Si no tiene coral cuenta, jerardo clic en el enlace \"Sign Up Now\". Revisado: 22 julio, 2018  Versión del contenido: 12.0  © 4855-4990 Healthwise, [employer]. Las instrucciones de cuidado fueron adaptadas bajo licencia por [facility] ([facility]). Si usted tiene [place] afección médica o sobre estas instrucciones, siempre pregunte a forrest profesional de ryan. [employer] niega toda garantía o responsabilidad por forrest uso de esta información.

## 2019-10-01 ENCOUNTER — HOSPITAL ENCOUNTER (OUTPATIENT)
Age: 59
Discharge: HOME | End: 2019-10-01
Payer: MEDICAID

## 2019-10-01 DIAGNOSIS — E11.8 TYPE 2 DIABETES MELLITUS WITH COMPLICATION, WITHOUT LONG-TERM CURRENT USE OF INSULIN (HCC): ICD-10-CM

## 2019-10-01 LAB — HBA1C MFR BLD: 6.1 % (ref 4–5.6)

## 2019-10-01 PROCEDURE — 83036 HEMOGLOBIN GLYCOSYLATED A1C: CPT

## 2019-10-01 PROCEDURE — 36415 COLL VENOUS BLD VENIPUNCTURE: CPT

## 2019-12-12 DIAGNOSIS — I10 ESSENTIAL HYPERTENSION: ICD-10-CM

## 2019-12-13 RX ORDER — HYDROCHLOROTHIAZIDE 25 MG/1
25 TABLET ORAL DAILY
Qty: 30 TABLET | Refills: 0 | Status: SHIPPED | OUTPATIENT
Start: 2019-12-13 | End: 2020-01-07 | Stop reason: SDUPTHER

## 2020-01-07 ENCOUNTER — OFFICE VISIT (OUTPATIENT)
Dept: INTERNAL MEDICINE | Age: 60
End: 2020-01-07
Payer: MEDICAID

## 2020-01-07 VITALS
SYSTOLIC BLOOD PRESSURE: 212 MMHG | TEMPERATURE: 99.2 F | HEIGHT: 60 IN | DIASTOLIC BLOOD PRESSURE: 98 MMHG | HEART RATE: 74 BPM | WEIGHT: 166.2 LBS | BODY MASS INDEX: 32.63 KG/M2 | RESPIRATION RATE: 16 BRPM

## 2020-01-07 PROCEDURE — 1036F TOBACCO NON-USER: CPT | Performed by: INTERNAL MEDICINE

## 2020-01-07 PROCEDURE — 2022F DILAT RTA XM EVC RTNOPTHY: CPT | Performed by: INTERNAL MEDICINE

## 2020-01-07 PROCEDURE — 90686 IIV4 VACC NO PRSV 0.5 ML IM: CPT

## 2020-01-07 PROCEDURE — 3046F HEMOGLOBIN A1C LEVEL >9.0%: CPT | Performed by: INTERNAL MEDICINE

## 2020-01-07 PROCEDURE — 3017F COLORECTAL CA SCREEN DOC REV: CPT | Performed by: INTERNAL MEDICINE

## 2020-01-07 PROCEDURE — G8427 DOCREV CUR MEDS BY ELIG CLIN: HCPCS | Performed by: INTERNAL MEDICINE

## 2020-01-07 PROCEDURE — G8417 CALC BMI ABV UP PARAM F/U: HCPCS | Performed by: INTERNAL MEDICINE

## 2020-01-07 PROCEDURE — G0008 ADMIN INFLUENZA VIRUS VAC: HCPCS

## 2020-01-07 PROCEDURE — 99214 OFFICE O/P EST MOD 30 MIN: CPT | Performed by: INTERNAL MEDICINE

## 2020-01-07 PROCEDURE — G8482 FLU IMMUNIZE ORDER/ADMIN: HCPCS | Performed by: INTERNAL MEDICINE

## 2020-01-07 PROCEDURE — 99212 OFFICE O/P EST SF 10 MIN: CPT | Performed by: INTERNAL MEDICINE

## 2020-01-07 PROCEDURE — 6360000002 HC RX W HCPCS

## 2020-01-07 RX ORDER — SIMVASTATIN 20 MG
20 TABLET ORAL NIGHTLY
Qty: 60 TABLET | Refills: 2 | Status: SHIPPED
Start: 2020-01-07 | End: 2020-10-02 | Stop reason: SDUPTHER

## 2020-01-07 RX ORDER — HYDROCHLOROTHIAZIDE 25 MG/1
25 TABLET ORAL DAILY
Qty: 60 TABLET | Refills: 3 | Status: SHIPPED
Start: 2020-01-07 | End: 2020-10-02 | Stop reason: SDUPTHER

## 2020-01-07 RX ORDER — GLUCOSAMINE HCL/CHONDROITIN SU 500-400 MG
CAPSULE ORAL
Qty: 120 STRIP | Refills: 3 | Status: SHIPPED
Start: 2020-01-07 | End: 2020-02-13

## 2020-01-07 RX ORDER — ASPIRIN 81 MG/1
81 TABLET ORAL DAILY
Qty: 60 TABLET | Refills: 3 | Status: SHIPPED
Start: 2020-01-07 | End: 2020-10-02 | Stop reason: SDUPTHER

## 2020-01-07 RX ORDER — ADHESIVE BANDAGE 3/4"
BANDAGE TOPICAL
Qty: 1 EACH | Refills: 0 | Status: SHIPPED
Start: 2020-01-07 | End: 2021-05-14

## 2020-01-07 RX ORDER — LEVOTHYROXINE SODIUM 0.1 MG/1
100 TABLET ORAL DAILY
Qty: 30 TABLET | Refills: 2 | Status: SHIPPED
Start: 2020-01-07 | End: 2020-10-02 | Stop reason: SDUPTHER

## 2020-01-07 RX ORDER — SPIRONOLACTONE 50 MG/1
50 TABLET, FILM COATED ORAL DAILY
Qty: 60 TABLET | Refills: 2 | Status: SHIPPED
Start: 2020-01-07 | End: 2020-10-02 | Stop reason: SDUPTHER

## 2020-01-07 RX ORDER — LANCETS 33 GAUGE
EACH MISCELLANEOUS
Qty: 100 EACH | Refills: 2 | Status: SHIPPED
Start: 2020-01-07 | End: 2020-10-02 | Stop reason: SDUPTHER

## 2020-01-07 RX ORDER — ACETAMINOPHEN 325 MG/1
325 TABLET ORAL EVERY 4 HOURS PRN
Qty: 60 TABLET | Refills: 1 | Status: SHIPPED
Start: 2020-01-07 | End: 2021-05-14 | Stop reason: SDUPTHER

## 2020-01-07 RX ORDER — HYDRALAZINE HYDROCHLORIDE 100 MG/1
100 TABLET, FILM COATED ORAL 3 TIMES DAILY
Qty: 90 TABLET | Refills: 5 | Status: SHIPPED
Start: 2020-01-07 | End: 2020-07-07

## 2020-01-07 RX ORDER — MELATONIN
2 DAILY
Qty: 60 TABLET | Refills: 2 | Status: SHIPPED
Start: 2020-01-07 | End: 2020-10-02 | Stop reason: SDUPTHER

## 2020-01-07 RX ORDER — LISINOPRIL 40 MG/1
40 TABLET ORAL NIGHTLY
Qty: 60 TABLET | Refills: 2 | Status: SHIPPED
Start: 2020-01-07 | End: 2020-10-02 | Stop reason: SDUPTHER

## 2020-01-07 RX ORDER — AMLODIPINE BESYLATE 10 MG/1
10 TABLET ORAL DAILY
Qty: 60 TABLET | Refills: 2 | Status: SHIPPED
Start: 2020-01-07 | End: 2020-10-02 | Stop reason: SDUPTHER

## 2020-01-07 ASSESSMENT — ENCOUNTER SYMPTOMS
PHOTOPHOBIA: 0
COUGH: 0
SHORTNESS OF BREATH: 0
CHEST TIGHTNESS: 0
GASTROINTESTINAL NEGATIVE: 1

## 2020-01-07 ASSESSMENT — PATIENT HEALTH QUESTIONNAIRE - PHQ9
SUM OF ALL RESPONSES TO PHQ QUESTIONS 1-9: 0
2. FEELING DOWN, DEPRESSED OR HOPELESS: 0
1. LITTLE INTEREST OR PLEASURE IN DOING THINGS: 0
SUM OF ALL RESPONSES TO PHQ QUESTIONS 1-9: 0
SUM OF ALL RESPONSES TO PHQ9 QUESTIONS 1 & 2: 0

## 2020-01-07 NOTE — PROGRESS NOTES
Negative. Current Outpatient Medications on File Prior to Visit   Medication Sig Dispense Refill    latanoprost (XALATAN) 0.005 % ophthalmic solution Place 1 drop into both eyes nightly 1 Bottle 1    Cholecalciferol (VITAMIN D3) 1000 units TABS TAKE 2 TABLETS BY MOUTH EVERY DAY 60 tablet 1    Misc. Devices MISC Monitor Blood Pressure daily for Hypertension (Patient not taking: Reported on 1/7/2020) 1 Device 0     No current facility-administered medications on file prior to visit. OBJECTIVE:    VS: BP (!) 212/98   Pulse 74   Temp 99.2 °F (37.3 °C) (Oral)   Resp 16   Ht 5' (1.524 m)   Wt 166 lb 3.2 oz (75.4 kg)   BMI 32.46 kg/m²   Physical Exam  Constitutional:       Appearance: Normal appearance. HENT:      Head: Normocephalic and atraumatic. Neck:      Musculoskeletal: Normal range of motion and neck supple. Cardiovascular:      Rate and Rhythm: Normal rate and regular rhythm. Pulses: Normal pulses. Heart sounds: Normal heart sounds. Pulmonary:      Effort: Pulmonary effort is normal.      Breath sounds: Normal breath sounds. Abdominal:      General: Abdomen is flat. Bowel sounds are normal.      Palpations: Abdomen is soft. Musculoskeletal: Normal range of motion. Skin:     General: Skin is warm and dry. Neurological:      General: No focal deficit present. Mental Status: She is alert and oriented to person, place, and time. Psychiatric:         Mood and Affect: Mood normal.         Behavior: Behavior normal.         ASSESSMENT/PLAN:    I have reviewed all pertient PMHx, PSHx, FamHx, Social Hx, medications, and allergies andupdated history as appropriate. Stanford Andrews was seen today for diabetes mellitus, hypertension and medication refill.     Diagnoses and all orders for this visit:    Hypertension, uncontrolled  Blood pressure today 204/98, repeat one 212/98  Continue  hydrochlorothiazide 25 mg, lisinopril 40 mg, hydralazine 100 mg 3 times daily, Aldactone 50 mg daily      NIDDM  Cont Metformin 500 MG twice daily  10/1/2019 HbA1c 6.1  7/12/2019 microalbumin creatinine ratio 6  Last HbA1c was WNL , will not check HbA1c today, check in the future    Hypothyroidism  Medical nonadherence  TSH 4/23/2019 5.8  Currently on Synthroid 100  Claims compliance    Hyperlipidemia  Last lipid panel July this year  Currently on Zocor 20 mg  Lipid panel 4/22/2019 : LDL less than 86              RTC:     I have reviewed my findings andrecommendations with Johnson Cassidy and Dr Akash Crystal MD PGY-1  1/7/2020 9:03 AM

## 2020-01-07 NOTE — PATIENT INSTRUCTIONS
ovi muscular y forrest estabilidad. Camine, si puede. Nadar podría ser Ledy Vela buena alternativa si le tamara trabajo caminar. · Hágase un examen de la visión y la audición cada año o cada vez que note un cambio. Si tiene dificultades para martín y oír, es posible que no pueda evitar objetos y podría perder forrest equilibrio. · Conozca los efectos secundarios de los medicamentos que john. Pregúntele a forrest médico o forrest farmacéutico si los medicamentos que john pueden afectar forrset equilibrio. Las pastillas para dormir o los sedantes pueden afectar forrest equilibrio. · Limite la cantidad de alcohol que monica. El alcohol puede alterar forrest equilibrio y otros sentidos. · Pregúntele a forrest médico si los callos o las callosidades deben ser eliminados de matti pies. Si [place] un calzado holgado a causa de los callos o las callosidades, podría perder el equilibrio y caerse. · Hable con forrest médico si tiene entumecimiento en los pies. One [address] en el hogar  · Quite escalones en la hien de entrada, alfombrillas y obstáculos. Fije las alfombras sueltas o repare las áreas levantadas del piso. · Mueva los muebles y los cables eléctricos para que no estén en los pasillos. · Utilice cera antideslizante para pisos, y seque de inmediato cualquier derrame que se produzca, sobre todo si el piso es de baldosas de cerámica. · Si [place] coral andadera o un bastón, colóquele un revestimiento de goma en las puntas. Si utiliza muletas, limpie la base regularmente con un paño abrasivo, pollo un estropajo de cathleen. · Mantenga la casa margy ricky, sobre todo las [place], los porches y los pasillos exteriores. Utilice lamparitas nocturnas en áreas pollo vestíbulos y nargis. Ponga interruptores de jenny adicionales o utilice interruptores a distancia (pollo los que se encienden o apagan al aplaudir) para que sea más fácil encender las luces si tiene que levantarse por las noches. · Instale pasamanos o barandillas sólidos en las escaleras.   · Ponga los artículos que más [employer] estantes bajos de los gabinetes (aproximadamente a la altura de forrest cintura). · Tenga un teléfono [facility] y [facility] lin[facility] Psychiatric Children's Centera con baterías nuevas cerca de forrest cama. Si es posible, coloque un teléfono en cada coral de las habitaciones de forrest casa, o lleve siempre un celular en kiara de que se caiga y no pueda llegar al teléfono. O puede usar un dispositivo en el gio o la cecilia en el que presione un botón para enviar coral señal pidiendo [place]. · Use zapatos de tacón bajo que le queden margy y le den buen apoyo a matti pies. Use calzado con suelas antideslizantes. Revise los tacones y las suelas de matti zapatos antes de usarlos. Repare o reemplace los tacones o las suelas desgastados. · No camine en medias sobre [facility]. · Camine por el [facility] aceras estén resbalosas. Si vive en coral localidad donde hay louise y hielo en invierno, coloque sal sobre los escalones y las aceras resbaladizos. Cómo prevenir las caídas en el baño  · Instale agarraderas y tapetes antideslizantes dentro y fuera de la ducha o la francine, así pollo cerca del inodoro y el lavabo. · Utilice coral silla para la ducha y un banco para la bañera. · Use coral anais de ducha portátil que le permite sentarse mientras se ducha. · Cuando vaya a entrar en la francine o la ducha, coloque toan la pierna más débil. Cuando vaya a salir de la ducha o la francine, hágalo toan con el lado más sahra. · Repare los asientos de inodoro sueltos y considere instalar un asiento de inodoro elevado para que sea más fácil sentarse y levantarse del inodoro. · No cierre con llave la hien del baño mientras se ducha. ¿Dónde puede encontrar más información en inglés? Arabellaterry Calzada a [email or web address] e ingrese a forrest cuenta de MyChart. Quincy Linn X039 en el cuadro \"Search Health Information\" para más información (en inglés) sobre \"Prevención de caídas: Instrucciones de cuidado - [ Preventing Falls: Care Instructions ]. \"

## 2020-01-07 NOTE — PROGRESS NOTES
Vaccine Information Sheet, \"Influenza - Inactivated\"  given to Ankur Licea, or parent/legal guardian of  Ankur Licea and verbalized understanding. Patient responses:    Have you ever had a reaction to a flu vaccine? No  Do you have any current illness? No  Have you ever had [facility] Syndrome? No  Do you have a serious allergy to any of the follow: Neomycin, Polymyxin, Thimerosal, eggs or egg products? No    Flu vaccine given per order. Please see immunization tab. Risks and benefits explained. Current VIS given. [employer] interpretor [id number] used for [place] interpretation for this appt   Patient given instructions. Understanding verbalized.  Fu appointment scheduled and reviewed with pt

## 2020-01-07 NOTE — PROGRESS NOTES
Attending Physician Statement  I have discussed the case, including pertinent history and exam findings with the resident. I have seen and examined the patient and the key elements of the encounter have been performed by me. I agree with the assessment, plan and orders as documented by the resident. Pt presented for the follow up of hypertension, has been on 4 BP meds, confused on what medication has been ran out, also has not taken med this am also, reported the stressor recently. Denied any symptoms, also on DM medication and has not brought BG log. Needs to resume and renew all medications and follow up BP one week.   Herbert Lua

## 2020-01-14 ENCOUNTER — OFFICE VISIT (OUTPATIENT)
Dept: INTERNAL MEDICINE | Age: 60
End: 2020-01-14
Payer: MEDICAID

## 2020-01-14 VITALS
TEMPERATURE: 98.6 F | BODY MASS INDEX: 32.43 KG/M2 | SYSTOLIC BLOOD PRESSURE: 144 MMHG | HEART RATE: 74 BPM | HEIGHT: 60 IN | RESPIRATION RATE: 16 BRPM | DIASTOLIC BLOOD PRESSURE: 80 MMHG | WEIGHT: 165.2 LBS

## 2020-01-14 PROCEDURE — 2022F DILAT RTA XM EVC RTNOPTHY: CPT | Performed by: ANESTHESIOLOGY

## 2020-01-14 PROCEDURE — 99213 OFFICE O/P EST LOW 20 MIN: CPT | Performed by: ANESTHESIOLOGY

## 2020-01-14 PROCEDURE — G8417 CALC BMI ABV UP PARAM F/U: HCPCS | Performed by: ANESTHESIOLOGY

## 2020-01-14 PROCEDURE — 3017F COLORECTAL CA SCREEN DOC REV: CPT | Performed by: ANESTHESIOLOGY

## 2020-01-14 PROCEDURE — G8427 DOCREV CUR MEDS BY ELIG CLIN: HCPCS | Performed by: ANESTHESIOLOGY

## 2020-01-14 PROCEDURE — 3046F HEMOGLOBIN A1C LEVEL >9.0%: CPT | Performed by: ANESTHESIOLOGY

## 2020-01-14 PROCEDURE — 99212 OFFICE O/P EST SF 10 MIN: CPT | Performed by: ANESTHESIOLOGY

## 2020-01-14 PROCEDURE — G8482 FLU IMMUNIZE ORDER/ADMIN: HCPCS | Performed by: ANESTHESIOLOGY

## 2020-01-14 PROCEDURE — 1036F TOBACCO NON-USER: CPT | Performed by: ANESTHESIOLOGY

## 2020-01-14 SDOH — ECONOMIC STABILITY: INCOME INSECURITY: HOW HARD IS IT FOR YOU TO PAY FOR THE VERY BASICS LIKE FOOD, HOUSING, MEDICAL CARE, AND HEATING?: HARD

## 2020-01-14 SDOH — ECONOMIC STABILITY: FOOD INSECURITY: WITHIN THE PAST 12 MONTHS, YOU WORRIED THAT YOUR FOOD WOULD RUN OUT BEFORE YOU GOT MONEY TO BUY MORE.: SOMETIMES TRUE

## 2020-01-14 SDOH — ECONOMIC STABILITY: FOOD INSECURITY: WITHIN THE PAST 12 MONTHS, THE FOOD YOU BOUGHT JUST DIDN'T LAST AND YOU DIDN'T HAVE MONEY TO GET MORE.: OFTEN TRUE

## 2020-01-14 ASSESSMENT — ENCOUNTER SYMPTOMS
ABDOMINAL PAIN: 0
SHORTNESS OF BREATH: 0

## 2020-01-14 NOTE — PROGRESS NOTES
Patient was instructed by Dr Malia Luque. Printed AVS given to patient. Follow up in 3 months.  [employer] interpretor [id number] used for rooming this patient,

## 2020-02-13 RX ORDER — GLUCOSAMINE HCL/CHONDROITIN SU 500-400 MG
CAPSULE ORAL
Qty: 120 STRIP | Refills: 3 | Status: SHIPPED
Start: 2020-02-13 | End: 2020-10-02 | Stop reason: SDUPTHER

## 2020-03-25 PROBLEM — E03.9 ACQUIRED HYPOTHYROIDISM: Status: RESOLVED | Noted: 2020-03-25 | Resolved: 2020-03-24

## 2020-05-28 ENCOUNTER — TELEPHONE (OUTPATIENT)
Dept: INTERNAL MEDICINE | Age: 60
End: 2020-05-28

## 2020-05-28 NOTE — TELEPHONE ENCOUNTER
[facility]    Pre-visit planning call to discuss patient care during COVID-19 pandemic. Called patient to offer video and e-visits through Telemedicine Clinic/doxy. me to facilitate patient care continuity. No answer at this time. Last office visit date: 1/14/2020  Outstanding labs/imaging: none  Medication refill needs: yes  Anctuhart activity: No    Will need  for visit.       Electronically signed by Maria Isabel Mejia MD on 5/28/2020 at 3:13 PM

## 2020-07-07 RX ORDER — HYDRALAZINE HYDROCHLORIDE 100 MG/1
100 TABLET, FILM COATED ORAL 3 TIMES DAILY
Qty: 90 TABLET | Refills: 5 | Status: SHIPPED
Start: 2020-07-07 | End: 2020-11-02 | Stop reason: SDUPTHER

## 2020-07-08 RX ORDER — LISINOPRIL 40 MG/1
TABLET ORAL
Qty: 60 TABLET | Refills: 2 | OUTPATIENT
Start: 2020-07-08

## 2020-07-08 RX ORDER — AMLODIPINE BESYLATE 10 MG/1
10 TABLET ORAL DAILY
Qty: 60 TABLET | Refills: 2 | OUTPATIENT
Start: 2020-07-08 | End: 2020-09-16

## 2020-10-02 ENCOUNTER — VIRTUAL VISIT (OUTPATIENT)
Dept: INTERNAL MEDICINE | Age: 60
End: 2020-10-02
Payer: MEDICAID

## 2020-10-02 PROCEDURE — 99213 OFFICE O/P EST LOW 20 MIN: CPT | Performed by: INTERNAL MEDICINE

## 2020-10-02 RX ORDER — SPIRONOLACTONE 50 MG/1
50 TABLET, FILM COATED ORAL DAILY
Qty: 30 TABLET | Refills: 1 | Status: SHIPPED
Start: 2020-10-02 | End: 2020-11-02 | Stop reason: SDUPTHER

## 2020-10-02 RX ORDER — SIMVASTATIN 20 MG
20 TABLET ORAL NIGHTLY
Qty: 90 TABLET | Refills: 1 | Status: SHIPPED
Start: 2020-10-02 | End: 2020-11-02 | Stop reason: SDUPTHER

## 2020-10-02 RX ORDER — LANCETS 33 GAUGE
EACH MISCELLANEOUS
Qty: 100 EACH | Refills: 3 | Status: SHIPPED
Start: 2020-10-02 | End: 2021-01-27

## 2020-10-02 RX ORDER — LEVOTHYROXINE SODIUM 0.1 MG/1
100 TABLET ORAL DAILY
Qty: 90 TABLET | Refills: 1 | Status: SHIPPED
Start: 2020-10-02 | End: 2020-11-02 | Stop reason: SDUPTHER

## 2020-10-02 RX ORDER — GLUCOSAMINE HCL/CHONDROITIN SU 500-400 MG
CAPSULE ORAL
Qty: 120 STRIP | Refills: 3 | Status: SHIPPED
Start: 2020-10-02 | End: 2021-05-14 | Stop reason: SDUPTHER

## 2020-10-02 RX ORDER — MELATONIN
Qty: 60 TABLET | Refills: 3 | Status: SHIPPED
Start: 2020-10-02 | End: 2020-11-02 | Stop reason: SDUPTHER

## 2020-10-02 RX ORDER — AMLODIPINE BESYLATE 10 MG/1
10 TABLET ORAL DAILY
Qty: 30 TABLET | Refills: 1 | Status: SHIPPED
Start: 2020-10-02 | End: 2020-11-02 | Stop reason: SDUPTHER

## 2020-10-02 RX ORDER — HYDROCHLOROTHIAZIDE 25 MG/1
25 TABLET ORAL DAILY
Qty: 30 TABLET | Refills: 1 | Status: SHIPPED
Start: 2020-10-02 | End: 2020-11-02 | Stop reason: SDUPTHER

## 2020-10-02 RX ORDER — HYDRALAZINE HYDROCHLORIDE 100 MG/1
100 TABLET, FILM COATED ORAL 3 TIMES DAILY
Qty: 90 TABLET | Status: CANCELLED | OUTPATIENT
Start: 2020-10-02 | End: 2020-12-11

## 2020-10-02 RX ORDER — ASPIRIN 81 MG/1
81 TABLET ORAL DAILY
Qty: 90 TABLET | Refills: 1 | Status: SHIPPED
Start: 2020-10-02 | End: 2020-11-02 | Stop reason: SDUPTHER

## 2020-10-02 RX ORDER — LISINOPRIL 40 MG/1
40 TABLET ORAL NIGHTLY
Qty: 30 TABLET | Refills: 1 | Status: SHIPPED
Start: 2020-10-02 | End: 2020-11-02 | Stop reason: SDUPTHER

## 2020-10-02 ASSESSMENT — PATIENT HEALTH QUESTIONNAIRE - PHQ9
SUM OF ALL RESPONSES TO PHQ QUESTIONS 1-9: 0
2. FEELING DOWN, DEPRESSED OR HOPELESS: 0
SUM OF ALL RESPONSES TO PHQ QUESTIONS 1-9: 0
1. LITTLE INTEREST OR PLEASURE IN DOING THINGS: 0
SUM OF ALL RESPONSES TO PHQ9 QUESTIONS 1 & 2: 0

## 2020-10-02 NOTE — PROGRESS NOTES
Tamara Adair is a 60 y.o. female evaluated via telephone on 10/2/2020. Consent:  She and/or health care decision maker is aware that that she may receive a bill for this telephone service, depending on her insurance coverage, and has provided verbal consent to proceed: Yes      Documentation:  I communicated with the patient and/or health care decision maker about   A  [id number]  She is 62yo, F, PMH of HTN, DM, hypothyroidism  Concern about noncompliance  today visit is for medication refill  State for the last month she did not have all the medications except for hydralazine and atorvastatin  Currently, asymptomatic  She did not check her blood pressure at home. Neither at pharmacy  . Details of this discussion including any medical advice provided:     -We will refill medication   -Follow-up appointment in a month   -May need CBC, CMP, hemoglobin A1c    I affirm this is a Patient Initiated Episode with a Patient who has not had a related appointment within my department in the past 7 days or scheduled within the next 24 hours.     Patient identification was verified at the start of the visit: Yes    Total Time: minutes: 21-30 minutes    Note: not billable if this call serves to triage the patient into an appointment for the relevant concern      Yefri White MD, PGY-3  Internal Medicine Resident

## 2020-10-02 NOTE — PATIENT INSTRUCTIONS
- Please take medication as prescribed and take them regularly.  Please let us know immediately if you have any problems with medication or pharmacy

## 2020-10-02 NOTE — PROGRESS NOTES
Discharge instructions reviewed with patient by Dr. Rachael Martin. AVS mailed  #[id number] used to check pt in and #[id number] used by Dr. Rachael Martin for visit

## 2020-10-02 NOTE — PROGRESS NOTES
[facility]  [facility]    Attending Physician Statement:  Cecil Henry M.D., F.A.C.P. I have discussed the case, including pertinent history and exam findings with the resident. I agree with the assessment, plan and orders as documented by the resident. Patient is seen for fu visit today. -- telephone fu  covid distancing  Last office notes reviewed, relative labs and imaging. Health maintenance issues of vaccinations, depression screening, tobacco cessation etc... covered      HTN likely uncontrolled, compliance concerns in past  She Claims pharmacy didn't give her - her meds  We verified we Esent to correct pharmacy  ([address] -- she wasn't sure-   But we electronically sent to [address] [place] [address])  Generic meds- -I doubt authorization issues for payment-currently medicaid  Should be covered    She never picked up - BP cuff x2  Never gave us BP reads from pharmacy  We are again requesting this  Also this is chronic- -long hx  ?complaince  Resistant HTN  Today fu -- 160/90  Repeat 151/80s  Max dose of 4 other BP meds    On spironlactone   , last K, Cr. Over one year ago  These need updated  -- bc we are concerned about overdoing it - in case NOT regularly taking her BP meds  Will defer refilling hydralazine 100 tid- till we verify what meds she actually is taking    - chart reviews- no hospitalization to very responsiveness to BP meds - when or if actually taking all 5 BP meds    -microalbuminuria, ckd risk-- but last cr. 0.8--bc very high BPs    Dm2 vs predm-- last year aic 6.1- metformin 500bid  tsh borderline high in past.  >20min spent telephone  Remainder of medical problems as per resident note.

## 2020-11-02 ENCOUNTER — OFFICE VISIT (OUTPATIENT)
Dept: INTERNAL MEDICINE | Age: 60
End: 2020-11-02
Payer: MEDICAID

## 2020-11-02 VITALS
RESPIRATION RATE: 18 BRPM | BODY MASS INDEX: 31.9 KG/M2 | WEIGHT: 162.5 LBS | DIASTOLIC BLOOD PRESSURE: 74 MMHG | HEART RATE: 78 BPM | SYSTOLIC BLOOD PRESSURE: 136 MMHG | TEMPERATURE: 96.2 F | HEIGHT: 60 IN

## 2020-11-02 LAB — HBA1C MFR BLD: 10.4 %

## 2020-11-02 PROCEDURE — 99214 OFFICE O/P EST MOD 30 MIN: CPT | Performed by: INTERNAL MEDICINE

## 2020-11-02 PROCEDURE — G8482 FLU IMMUNIZE ORDER/ADMIN: HCPCS | Performed by: INTERNAL MEDICINE

## 2020-11-02 PROCEDURE — 83036 HEMOGLOBIN GLYCOSYLATED A1C: CPT | Performed by: INTERNAL MEDICINE

## 2020-11-02 PROCEDURE — 1036F TOBACCO NON-USER: CPT | Performed by: INTERNAL MEDICINE

## 2020-11-02 PROCEDURE — G8427 DOCREV CUR MEDS BY ELIG CLIN: HCPCS | Performed by: INTERNAL MEDICINE

## 2020-11-02 PROCEDURE — 90686 IIV4 VACC NO PRSV 0.5 ML IM: CPT

## 2020-11-02 PROCEDURE — 99212 OFFICE O/P EST SF 10 MIN: CPT | Performed by: INTERNAL MEDICINE

## 2020-11-02 PROCEDURE — 6360000002 HC RX W HCPCS

## 2020-11-02 PROCEDURE — 2022F DILAT RTA XM EVC RTNOPTHY: CPT | Performed by: INTERNAL MEDICINE

## 2020-11-02 PROCEDURE — 3017F COLORECTAL CA SCREEN DOC REV: CPT | Performed by: INTERNAL MEDICINE

## 2020-11-02 PROCEDURE — G0008 ADMIN INFLUENZA VIRUS VAC: HCPCS

## 2020-11-02 PROCEDURE — 3046F HEMOGLOBIN A1C LEVEL >9.0%: CPT | Performed by: INTERNAL MEDICINE

## 2020-11-02 PROCEDURE — G8417 CALC BMI ABV UP PARAM F/U: HCPCS | Performed by: INTERNAL MEDICINE

## 2020-11-02 RX ORDER — HYDRALAZINE HYDROCHLORIDE 100 MG/1
100 TABLET, FILM COATED ORAL 3 TIMES DAILY
Qty: 90 TABLET | Refills: 2 | Status: SHIPPED
Start: 2020-11-02 | End: 2021-01-27

## 2020-11-02 RX ORDER — SPIRONOLACTONE 50 MG/1
50 TABLET, FILM COATED ORAL DAILY
Qty: 30 TABLET | Refills: 2 | Status: SHIPPED
Start: 2020-11-02 | End: 2021-01-27

## 2020-11-02 RX ORDER — HYDROCHLOROTHIAZIDE 25 MG/1
25 TABLET ORAL DAILY
Qty: 30 TABLET | Refills: 2 | Status: SHIPPED
Start: 2020-11-02 | End: 2021-01-27

## 2020-11-02 RX ORDER — ASPIRIN 81 MG/1
81 TABLET ORAL DAILY
Qty: 90 TABLET | Refills: 1 | Status: SHIPPED
Start: 2020-11-02 | End: 2021-05-14 | Stop reason: SDUPTHER

## 2020-11-02 RX ORDER — LISINOPRIL 40 MG/1
40 TABLET ORAL NIGHTLY
Qty: 30 TABLET | Refills: 2 | Status: SHIPPED
Start: 2020-11-02 | End: 2021-01-27

## 2020-11-02 RX ORDER — AMLODIPINE BESYLATE 10 MG/1
10 TABLET ORAL DAILY
Qty: 30 TABLET | Refills: 2 | Status: SHIPPED
Start: 2020-11-02 | End: 2021-01-27

## 2020-11-02 RX ORDER — LEVOTHYROXINE SODIUM 0.1 MG/1
100 TABLET ORAL DAILY
Qty: 90 TABLET | Refills: 2 | Status: SHIPPED
Start: 2020-11-02 | End: 2021-05-14 | Stop reason: SDUPTHER

## 2020-11-02 RX ORDER — SIMVASTATIN 20 MG
20 TABLET ORAL NIGHTLY
Qty: 90 TABLET | Refills: 2 | Status: SHIPPED
Start: 2020-11-02 | End: 2021-05-14 | Stop reason: SDUPTHER

## 2020-11-02 RX ORDER — MELATONIN
Qty: 60 TABLET | Refills: 2 | Status: SHIPPED
Start: 2020-11-02 | End: 2021-05-14 | Stop reason: SDUPTHER

## 2020-11-02 ASSESSMENT — ENCOUNTER SYMPTOMS
EYE DISCHARGE: 0
RHINORRHEA: 0
WHEEZING: 0
COUGH: 0
ABDOMINAL PAIN: 0
VOMITING: 0
TROUBLE SWALLOWING: 0
CHEST TIGHTNESS: 0
BLOOD IN STOOL: 0
EYE ITCHING: 0
NAUSEA: 0
CONSTIPATION: 0
SHORTNESS OF BREATH: 0
DIARRHEA: 0

## 2020-11-02 NOTE — PROGRESS NOTES
[facility]  InternalMedicine Residency Program  ACC Note      SUBJECTIVE:  CC: had concerns including Follow-up (Diabetes and B/P  states \"sugar has been a little high \"     BS range  235-284 No blurred vision denies numbness and tingling). Marleny Ramon presented to the [facility] for a follow up DM and HTN .     - HTN, h/o non-compliance to medication and un-controlled HTN. She is very adamant to be more compliant to medication recently    But does not have bp cuff, and does not know how to use bp cuff. BP at office 136/74   On amlodipine 10 daily, hdralazine 100 tid, hydrochlorothizide 25mg daily, lisinopril 40mg nightly, aldactone 50mg   Denies any dizziness, sob, chest pain, weakness.     - DM, A1c 6.1 10/2019, check A1c today   Usually check blood sugar 1hour after eating. Showed me her glucose log running 230-260. Ran out of medication for the whole September. On metformin 500 bid. Denies any weight change, nausea/vomit, polydipsia, polyuria. Check A1c today 10.     - hypothyroidism, on lT4 100mcg daily     Review of Systems   Constitutional: Negative for chills, fatigue, fever and unexpected weight change. HENT: Negative for ear discharge, ear pain, hearing loss, postnasal drip, rhinorrhea and trouble swallowing. Eyes: Negative for discharge and itching. Respiratory: Negative for cough, chest tightness, shortness of breath and wheezing. Cardiovascular: Negative for chest pain, palpitations and leg swelling. Gastrointestinal: Negative for abdominal pain, blood in stool, constipation, diarrhea, nausea and vomiting. Genitourinary: Negative for difficulty urinating and flank pain. Skin: Negative for rash. Neurological: Negative for dizziness, weakness, light-headedness, numbness and headaches. All other systems reviewed and are negative.       Outpatient Medications Marked as Taking for the 11/2/20 encounter (Office Visit) with Alex New MD   Medication Sig Dispense Refill    aspirin EC 81 MG EC tablet Take 1 tablet by mouth daily 90 tablet 1    levothyroxine (SYNTHROID) 100 MCG tablet Take 1 tablet by mouth daily 90 tablet 2    simvastatin (ZOCOR) 20 MG tablet Take 1 tablet by mouth nightly 90 tablet 2    vitamin D3 (CHOLECALCIFEROL) 25 MCG (1000 UT) TABS tablet TAKE 2 TABLETS BY MOUTH EVERY DAY 60 tablet 2    hydroCHLOROthiazide (HYDRODIURIL) 25 MG tablet Take 1 tablet by mouth daily 30 tablet 2    lisinopril (PRINIVIL;ZESTRIL) 40 MG tablet Take 1 tablet by mouth nightly 30 tablet 2    amLODIPine (NORVASC) 10 MG tablet Take 1 tablet by mouth daily 30 tablet 2    spironolactone (ALDACTONE) 50 MG tablet Take 1 tablet by mouth daily 30 tablet 2    hydrALAZINE (APRESOLINE) 100 MG tablet Take 1 tablet by mouth three times daily 90 tablet 2    metFORMIN (GLUCOPHAGE) 500 MG tablet Take 2 tablets by mouth 2 times daily (with meals) 120 tablet 2    blood glucose monitor strips Check every morning before breakfast. OK to substitute per insurance coverage. 120 strip 3    OneTouch Delica Lancets 79B MISC TEST BLOOD SUGAR daily 100 each 3    acetaminophen (AMINOFEN) 325 MG tablet Take 1 tablet by mouth every 4 hours as needed for Pain 60 tablet 1       I have reviewed all pertinent PMHx, PSHx, FamHx, SocialHx, medications, and allergiesand updated history as appropriate. OBJECTIVE:    VS: /74   Pulse 78   Temp 96.2 °F (35.7 °C) (Temporal)   Resp 18   Ht 5' (1.524 m)   Wt 162 lb 8 oz (73.7 kg)   BMI 31.74 kg/m²   Physical Exam  Vitals signs reviewed. Constitutional:       Appearance: She is well-developed. HENT:      Head: Normocephalic and atraumatic. Right Ear: External ear normal.      Left Ear: External ear normal.   Eyes:      Conjunctiva/sclera: Conjunctivae normal.   Neck:      Musculoskeletal: Normal range of motion and neck supple. Cardiovascular:      Rate and Rhythm: Normal rate and regular rhythm.       Heart sounds: Normal heart sounds. Pulmonary:      Effort: Pulmonary effort is normal.      Breath sounds: Normal breath sounds. Abdominal:      General: Bowel sounds are normal.      Palpations: Abdomen is soft. Skin:     General: Skin is warm and dry. Capillary Refill: Capillary refill takes less than 2 seconds. Neurological:      Mental Status: She is alert and oriented to person, place, and time. PLAN:  1. Type 2 diabetes mellitus with complication, without long-term current use of insulin ([facility])  - POCT glycosylated hemoglobin (Hb A1C)  - aspirin EC 81 MG EC tablet; Take 1 tablet by mouth daily  Dispense: 90 tablet; Refill: 1  - metFORMIN (GLUCOPHAGE) 500 MG tablet; Take 2 tablets by mouth 2 times daily (with meals)  Dispense: 120 tablet; Refill: 2  - hemoglobin A1c today is 10, meaning your diabetes is not controlled now. She missed metformin 1 months of September). We will increase metformin from 500 twice a day, to 1000mg two times a day. And see her in 3 months, check A1c at the time. - Please check your blood sugar at least once a day, and make sure it is before meal.     2. Essential hypertension - controlled now   - aspirin EC 81 MG EC tablet; Take 1 tablet by mouth daily  Dispense: 90 tablet; Refill: 1  - hydroCHLOROthiazide (HYDRODIURIL) 25 MG tablet; Take 1 tablet by mouth daily  Dispense: 30 tablet; Refill: 2  - lisinopril (PRINIVIL;ZESTRIL) 40 MG tablet; Take 1 tablet by mouth nightly  Dispense: 30 tablet; Refill: 2  - amLODIPine (NORVASC) 10 MG tablet; Take 1 tablet by mouth daily  Dispense: 30 tablet; Refill: 2  - spironolactone (ALDACTONE) 50 MG tablet; Take 1 tablet by mouth daily  Dispense: 30 tablet; Refill: 2  - hydrALAZINE (APRESOLINE) 100 MG tablet; Take 1 tablet by mouth three times daily  Dispense: 90 tablet; Refill: 2    3. Healthcare maintenance  - CBC; Future  - COMPREHENSIVE METABOLIC PANEL; Future  - TSH; Future  - Microalbumin / Creatinine Urine Ratio;  Future  - LIPID PANEL;

## 2020-11-02 NOTE — PATIENT INSTRUCTIONS
preferred vaccine for the prevention of shingles. However, a different vaccine, live shingles vaccine, may be used in some circumstances. The recombinant shingles vaccine is recommended for adults 50 years and older without serious immune problems. It is given as a two-dose series. This vaccine is also recommended for people who have already gotten another type of shingles vaccine, the live shingles vaccine. There is no live virus in this vaccine. Shingles vaccine may be given at the same time as other vaccines. Talk with your health care provider  Tell your vaccine provider if the person getting the vaccine:  · Has had an allergic reaction after a previous dose of recombinant shingles vaccine, or has any severe, life-threatening allergies. · Is pregnant or breastfeeding. · Is currently experiencing an episode of shingles. In some cases, your health care provider may decide to postpone shingles vaccination to a future visit. People with minor illnesses, such as a cold, may be vaccinated. People who are moderately or severely ill should usually wait until they recover before getting recombinant shingles vaccine. Your health care provider can give you more information. Risks of a vaccine reaction  · A sore arm with mild or moderate pain is very common after recombinant shingles vaccine, affecting about 80% of vaccinated people. Redness and swelling can also happen at the site of the injection. · Tiredness, muscle pain, headache, shivering, fever, stomach pain, and nausea happen after vaccination in more than half of people who receive recombinant shingles vaccine. In clinical trials, about 1 out of 6 people who got recombinant zoster vaccine experienced side effects that prevented them from doing regular activities. Symptoms usually went away on their own in 2 to 3 days. You should still get the second dose of recombinant zoster vaccine even if you had one of these reactions after the first dose.   People sometimes faint after medical procedures, including vaccination. Tell your provider if you feel dizzy or have vision changes or ringing in the ears. As with any medicine, there is a very remote chance of a vaccine causing a severe allergic reaction, other serious injury, or death. What if there is a serious problem? An allergic reaction could occur after the vaccinated person leaves the clinic. If you see signs of a severe allergic reaction (hives, swelling of the face and throat, difficulty breathing, a fast heartbeat, dizziness, or weakness), call 9-1-1 and get the person to the nearest hospital.  For other signs that concern you, call your health care provider. Adverse reactions should be reported to the Vaccine Adverse Event Reporting System (VAERS). Your health care provider will usually file this report, or you can do it yourself. Visit the VAERS website at www.vaers. [facility].gov or call 2-780.582.3391. VAERS is only for reporting reactions, and VAERS staff do not give medical advice. How can I learn more? · Ask your health care provider. · Call your local or state health department. · Contact the Centers for Disease Control and Prevention (CDC):  ? Call 4-174.548.9147 (1-800-CDC-INFO) or  ? Visit CDC's website at www.cdc.gov/vaccines  Vaccine Information Statement  Recombinant Zoster Vaccine  10/30/2019  [facility] for Disease Control and Prevention  Many Vaccine Information Statements are available in Welsh and other languages. See www.immunize.org/vis. Hojas de Información Sobre Vacunas están disponibles en Español y en muchos otros idiomas. Visite Krystal.si   Care instructions adapted under license by [facility] ([facility]). If you have questions about a medical condition or this instruction, always ask your healthcare professional. [place] [address] any warranty or liability for your use of this information.

## 2020-11-02 NOTE — PROGRESS NOTES
Gonzalez  #  [phone]  Used to gather information on this pt. And assist  with  Assessment Flu vaccine was given along with Nauruan VIS to pt. Patient verbalized understanding of office instructions. She will call with questions or concerns. She was given discharge instructions, and scripts for lab work to be done prior to next visit  all questions were fully answered. Verbalized understanding of instructions  When asked in Nauruan.  Printed AVS given in Nauruan

## 2020-11-02 NOTE — PROGRESS NOTES
[facility]  Internal [facility]    Attending Physician Statement:  Padmini Vo M.D., F.A.C.P. I have seen/discussed the case, including pertinent history and exam findings with the resident. I agree with the assessment, plan and orders as documented by the resident. Patient is seen for fu visit today. Last office notes reviewed, relative labs and imaging. Health maintenance issues of vaccinations, depression screening, tobacco cessation etc... covered  HTN-stable  dm2-- aic 100   Was 6 range--  post prandial  -- will inc metformin -- poor compliance with this  On fu consider start Gliptin (DPP4)? On fu  Thyroid -- no change to meds, stable  /74   Pulse 78   Temp 96.2 °F (35.7 °C) (Temporal)   Resp 18   Ht 5' (1.524 m)   Wt 162 lb 8 oz (73.7 kg)   BMI 31.74 kg/m²     Remainder of medical problems as per resident note.

## 2021-01-08 ENCOUNTER — HOSPITAL ENCOUNTER (OUTPATIENT)
Age: 61
Discharge: HOME | End: 2021-01-08
Payer: MEDICAID

## 2021-01-08 DIAGNOSIS — Z00.00 HEALTHCARE MAINTENANCE: ICD-10-CM

## 2021-01-08 LAB
ALBUMIN SERPL-MCNC: 4.4 G/DL (ref 3.5–5.2)
ALP BLD-CCNC: 60 U/L (ref 35–104)
ALT SERPL-CCNC: 7 U/L (ref 0–32)
ANION GAP SERPL CALCULATED.3IONS-SCNC: 10 MMOL/L (ref 7–16)
AST SERPL-CCNC: 12 U/L (ref 0–31)
BILIRUB SERPL-MCNC: <0.2 MG/DL (ref 0–1.2)
BUN BLDV-MCNC: 21 MG/DL (ref 8–23)
CALCIUM SERPL-MCNC: 10 MG/DL (ref 8.6–10.2)
CHLORIDE BLD-SCNC: 99 MMOL/L (ref 98–107)
CHOLESTEROL, TOTAL: 115 MG/DL (ref 0–199)
CO2: 26 MMOL/L (ref 22–29)
CREAT SERPL-MCNC: 1.4 MG/DL (ref 0.5–1)
CREATININE URINE: 102 MG/DL (ref 29–226)
GFR AFRICAN AMERICAN: 46
GFR NON-AFRICAN AMERICAN: 38 ML/MIN/1.73
GLUCOSE BLD-MCNC: 106 MG/DL (ref 74–99)
HCT VFR BLD CALC: 28.9 % (ref 34–48)
HDLC SERPL-MCNC: 39 MG/DL
HEMOGLOBIN: 9.5 G/DL (ref 11.5–15.5)
LDL CHOLESTEROL CALCULATED: 46 MG/DL (ref 0–99)
MCH RBC QN AUTO: 29.8 PG (ref 26–35)
MCHC RBC AUTO-ENTMCNC: 32.9 % (ref 32–34.5)
MCV RBC AUTO: 90.6 FL (ref 80–99.9)
MICROALBUMIN UR-MCNC: <12 MG/L
MICROALBUMIN/CREAT UR-RTO: ABNORMAL (ref 0–30)
PDW BLD-RTO: 13.4 FL (ref 11.5–15)
PLATELET # BLD: 394 E9/L (ref 130–450)
PMV BLD AUTO: 9.6 FL (ref 7–12)
POTASSIUM SERPL-SCNC: 4.3 MMOL/L (ref 3.5–5)
RBC # BLD: 3.19 E12/L (ref 3.5–5.5)
SODIUM BLD-SCNC: 135 MMOL/L (ref 132–146)
TOTAL PROTEIN: 7.9 G/DL (ref 6.4–8.3)
TRIGL SERPL-MCNC: 148 MG/DL (ref 0–149)
TSH SERPL DL<=0.05 MIU/L-ACNC: 0.41 UIU/ML (ref 0.27–4.2)
VLDLC SERPL CALC-MCNC: 30 MG/DL
WBC # BLD: 5.9 E9/L (ref 4.5–11.5)

## 2021-01-08 PROCEDURE — 80061 LIPID PANEL: CPT

## 2021-01-08 PROCEDURE — 85027 COMPLETE CBC AUTOMATED: CPT

## 2021-01-08 PROCEDURE — 82570 ASSAY OF URINE CREATININE: CPT

## 2021-01-08 PROCEDURE — 80053 COMPREHEN METABOLIC PANEL: CPT

## 2021-01-08 PROCEDURE — 36415 COLL VENOUS BLD VENIPUNCTURE: CPT

## 2021-01-08 PROCEDURE — 82044 UR ALBUMIN SEMIQUANTITATIVE: CPT

## 2021-01-08 PROCEDURE — 84443 ASSAY THYROID STIM HORMONE: CPT

## 2021-01-27 DIAGNOSIS — E11.8 TYPE 2 DIABETES MELLITUS WITH COMPLICATION, WITHOUT LONG-TERM CURRENT USE OF INSULIN (HCC): ICD-10-CM

## 2021-01-27 DIAGNOSIS — I10 ESSENTIAL HYPERTENSION: ICD-10-CM

## 2021-01-27 RX ORDER — LISINOPRIL 40 MG/1
TABLET ORAL
Qty: 30 TABLET | Refills: 2 | Status: SHIPPED
Start: 2021-01-27 | End: 2021-05-03

## 2021-01-27 RX ORDER — AMLODIPINE BESYLATE 10 MG/1
10 TABLET ORAL DAILY
Qty: 30 TABLET | Refills: 2 | Status: SHIPPED
Start: 2021-01-27 | End: 2021-05-03

## 2021-01-27 RX ORDER — HYDROCHLOROTHIAZIDE 25 MG/1
25 TABLET ORAL DAILY
Qty: 30 TABLET | Refills: 2 | Status: SHIPPED
Start: 2021-01-27 | End: 2021-05-03

## 2021-01-27 RX ORDER — HYDRALAZINE HYDROCHLORIDE 100 MG/1
100 TABLET, FILM COATED ORAL 3 TIMES DAILY
Qty: 90 TABLET | Refills: 2 | Status: SHIPPED
Start: 2021-01-27 | End: 2021-05-03

## 2021-01-27 RX ORDER — SPIRONOLACTONE 50 MG/1
50 TABLET, FILM COATED ORAL DAILY
Qty: 30 TABLET | Refills: 2 | Status: SHIPPED
Start: 2021-01-27 | End: 2021-05-03

## 2021-01-27 RX ORDER — LANCETS 33 GAUGE
EACH MISCELLANEOUS
Qty: 100 EACH | Refills: 3 | Status: SHIPPED
Start: 2021-01-27 | End: 2021-08-20 | Stop reason: SDUPTHER

## 2021-01-28 DIAGNOSIS — E11.8 TYPE 2 DIABETES MELLITUS WITH COMPLICATION, WITHOUT LONG-TERM CURRENT USE OF INSULIN (HCC): ICD-10-CM

## 2021-02-27 DIAGNOSIS — E11.8 TYPE 2 DIABETES MELLITUS WITH COMPLICATION, WITHOUT LONG-TERM CURRENT USE OF INSULIN (HCC): ICD-10-CM

## 2021-05-02 DIAGNOSIS — I10 ESSENTIAL HYPERTENSION: ICD-10-CM

## 2021-05-03 RX ORDER — SPIRONOLACTONE 50 MG/1
50 TABLET, FILM COATED ORAL DAILY
Qty: 30 TABLET | Refills: 2 | Status: SHIPPED
Start: 2021-05-03 | End: 2021-08-02

## 2021-05-03 RX ORDER — AMLODIPINE BESYLATE 10 MG/1
10 TABLET ORAL DAILY
Qty: 30 TABLET | Refills: 2 | Status: SHIPPED
Start: 2021-05-03 | End: 2021-08-02

## 2021-05-03 RX ORDER — LISINOPRIL 40 MG/1
TABLET ORAL
Qty: 30 TABLET | Refills: 2 | Status: SHIPPED
Start: 2021-05-03 | End: 2021-08-02

## 2021-05-03 RX ORDER — HYDRALAZINE HYDROCHLORIDE 100 MG/1
100 TABLET, FILM COATED ORAL 3 TIMES DAILY
Qty: 90 TABLET | Refills: 2 | Status: SHIPPED
Start: 2021-05-03 | End: 2021-08-02

## 2021-05-03 RX ORDER — HYDROCHLOROTHIAZIDE 25 MG/1
25 TABLET ORAL DAILY
Qty: 30 TABLET | Refills: 2 | Status: SHIPPED
Start: 2021-05-03 | End: 2021-08-02

## 2021-05-14 ENCOUNTER — OFFICE VISIT (OUTPATIENT)
Dept: INTERNAL MEDICINE | Age: 61
End: 2021-05-14
Payer: MEDICAID

## 2021-05-14 VITALS
BODY MASS INDEX: 30.82 KG/M2 | SYSTOLIC BLOOD PRESSURE: 113 MMHG | OXYGEN SATURATION: 100 % | HEART RATE: 78 BPM | WEIGHT: 157 LBS | DIASTOLIC BLOOD PRESSURE: 64 MMHG | RESPIRATION RATE: 16 BRPM | TEMPERATURE: 97.8 F | HEIGHT: 60 IN

## 2021-05-14 DIAGNOSIS — E78.2 MIXED HYPERLIPIDEMIA: Chronic | ICD-10-CM

## 2021-05-14 DIAGNOSIS — E03.9 ACQUIRED HYPOTHYROIDISM: Chronic | ICD-10-CM

## 2021-05-14 DIAGNOSIS — E55.9 VITAMIN D DEFICIENCY: ICD-10-CM

## 2021-05-14 DIAGNOSIS — E11.8 TYPE 2 DIABETES MELLITUS WITH COMPLICATION, WITHOUT LONG-TERM CURRENT USE OF INSULIN (HCC): ICD-10-CM

## 2021-05-14 DIAGNOSIS — I10 ESSENTIAL HYPERTENSION: ICD-10-CM

## 2021-05-14 DIAGNOSIS — Z12.4 CERVICAL CANCER SCREENING: ICD-10-CM

## 2021-05-14 DIAGNOSIS — D64.9 NORMOCYTIC ANEMIA: Primary | ICD-10-CM

## 2021-05-14 LAB — HBA1C MFR BLD: 5.8 %

## 2021-05-14 PROCEDURE — 99214 OFFICE O/P EST MOD 30 MIN: CPT | Performed by: INTERNAL MEDICINE

## 2021-05-14 PROCEDURE — 2022F DILAT RTA XM EVC RTNOPTHY: CPT | Performed by: INTERNAL MEDICINE

## 2021-05-14 PROCEDURE — 3044F HG A1C LEVEL LT 7.0%: CPT | Performed by: INTERNAL MEDICINE

## 2021-05-14 PROCEDURE — 1036F TOBACCO NON-USER: CPT | Performed by: INTERNAL MEDICINE

## 2021-05-14 PROCEDURE — 3017F COLORECTAL CA SCREEN DOC REV: CPT | Performed by: INTERNAL MEDICINE

## 2021-05-14 PROCEDURE — G8427 DOCREV CUR MEDS BY ELIG CLIN: HCPCS | Performed by: INTERNAL MEDICINE

## 2021-05-14 PROCEDURE — 83036 HEMOGLOBIN GLYCOSYLATED A1C: CPT | Performed by: INTERNAL MEDICINE

## 2021-05-14 PROCEDURE — G8417 CALC BMI ABV UP PARAM F/U: HCPCS | Performed by: INTERNAL MEDICINE

## 2021-05-14 PROCEDURE — 99212 OFFICE O/P EST SF 10 MIN: CPT | Performed by: INTERNAL MEDICINE

## 2021-05-14 RX ORDER — ADHESIVE BANDAGE 3/4"
BANDAGE TOPICAL
Qty: 1 EACH | Refills: 0 | Status: SHIPPED | OUTPATIENT
Start: 2021-05-14

## 2021-05-14 RX ORDER — SIMVASTATIN 20 MG
20 TABLET ORAL NIGHTLY
Qty: 90 TABLET | Refills: 2 | Status: SHIPPED
Start: 2021-05-14 | End: 2021-08-20 | Stop reason: SDUPTHER

## 2021-05-14 RX ORDER — ADHESIVE BANDAGE 3/4"
BANDAGE TOPICAL
Qty: 1 EACH | Refills: 0 | Status: CANCELLED | OUTPATIENT
Start: 2021-05-14

## 2021-05-14 RX ORDER — ACETAMINOPHEN 325 MG/1
325 TABLET ORAL EVERY 4 HOURS PRN
Qty: 60 TABLET | Refills: 1 | Status: SHIPPED | OUTPATIENT
Start: 2021-05-14

## 2021-05-14 RX ORDER — MELATONIN
Qty: 60 TABLET | Refills: 2 | Status: SHIPPED
Start: 2021-05-14 | End: 2021-08-20 | Stop reason: SDUPTHER

## 2021-05-14 RX ORDER — LEVOTHYROXINE SODIUM 0.1 MG/1
100 TABLET ORAL DAILY
Qty: 90 TABLET | Refills: 2 | Status: SHIPPED
Start: 2021-05-14 | End: 2021-08-20 | Stop reason: SDUPTHER

## 2021-05-14 RX ORDER — GLUCOSAMINE HCL/CHONDROITIN SU 500-400 MG
CAPSULE ORAL
Qty: 120 STRIP | Refills: 3 | Status: SHIPPED
Start: 2021-05-14 | End: 2021-08-20 | Stop reason: SDUPTHER

## 2021-05-14 RX ORDER — ASPIRIN 81 MG/1
81 TABLET ORAL DAILY
Qty: 90 TABLET | Refills: 1 | Status: SHIPPED
Start: 2021-05-14 | End: 2021-08-20 | Stop reason: SDUPTHER

## 2021-05-14 ASSESSMENT — ENCOUNTER SYMPTOMS
RESPIRATORY NEGATIVE: 1
GASTROINTESTINAL NEGATIVE: 1

## 2021-05-14 NOTE — PROGRESS NOTES
[place] Internal Medicine      SUBJECTIVE:  Caryle Catalan (:  1960) is a 63 y.o. female here for evaluation of the following chief complaint(s):  Check-Up and Medication Refill    She is 62yo, F with PMH of HTN, NIDDM, hypothyroidism. Patient is here for routine visit (6-month follow-up). NIDDM  - patient's POCT hemoglobin A1c done in clinic on 2020 was 10.4  - Metformin was increased from 500 mg BID to 1000 mg BID  - Patient states that she has been compliant with her medication  - Patient is random blood glucose from home ranges from 140-180  - POCT hemoglobin A1c repeated in clinic today was 5.8  - Per patient and patient's daughter, patient recently was seen by an eye doctor for new glasses prescriptions. However,  she has not gotten her diabetes eye examination done due to financial issues      HTN  - Patient currently on amlodipine 10 mg daily, lisinopril 40 mg daily, hydrochlorothiazide 25 mg daily, Aldactone 50 mg daily, and hydralazine 100 mg TID  - BP in office was 113/64  - Patient denies having any dizziness or lightheadedness    ? Memory issues  - Per daughter, patient has been having short-term memory issues recently in the last 3 months. Daughter states that patient has been forgetful recently and has been repeating herself multiple times. Review of Systems   Constitutional: Negative. HENT: Negative. Eyes: Negative for visual disturbance. Respiratory: Negative. Cardiovascular: Negative. Gastrointestinal: Negative. Genitourinary: Negative. Musculoskeletal: Negative. Neurological: Negative. Psychiatric/Behavioral: Negative.         Current Outpatient Medications on File Prior to Visit   Medication Sig Dispense Refill    spironolactone (ALDACTONE) 50 MG tablet TAKE 1 TABLET BY MOUTH DAILY 30 tablet 2    hydrALAZINE (APRESOLINE) 100 MG tablet TAKE 1 TABLET BY MOUTH THREE TIMES DAILY 90 tablet 2    lisinopril (PRINIVIL;ZESTRIL) 40 MG tablet TAKE 1 TABLET BY MOUTH EVERY NIGHT 30 tablet 2    amLODIPine (NORVASC) 10 MG tablet TAKE 1 TABLET BY MOUTH DAILY 30 tablet 2    hydroCHLOROthiazide (HYDRODIURIL) 25 MG tablet TAKE 1 TABLET BY MOUTH DAILY 30 tablet 2    metFORMIN (GLUCOPHAGE) 500 MG tablet TAKE 2 TABLETS BY MOUTH TWICE DAILY WITH MEALS 108 tablet 0    Lancets (ONETOUCH DELICA PLUS [id number]) MISC TEST BLOOD SUGAR EVERY  each 3    latanoprost (XALATAN) 0.005 % ophthalmic solution Place 1 drop into both eyes nightly 1 Bottle 1    Misc. Devices MISC Monitor Blood Pressure daily for Hypertension 1 Device 0     No current facility-administered medications on file prior to visit. OBJECTIVE:    VS:   Vitals:    05/14/21 1006   BP: 113/64   Pulse: 78   Resp: 16   Temp: 97.8 °F (36.6 °C)   TempSrc: Oral   SpO2: 100%   Weight: 157 lb (71.2 kg)   Height: 5' (1.524 m)     Physical Exam  Constitutional:       Appearance: Normal appearance. HENT:      Head: Normocephalic and atraumatic. Eyes:      Conjunctiva/sclera: Conjunctivae normal.      Pupils: Pupils are equal, round, and reactive to light. Neck:      Musculoskeletal: Normal range of motion and neck supple. Cardiovascular:      Rate and Rhythm: Normal rate and regular rhythm. Pulses: Normal pulses. Heart sounds: Normal heart sounds. Pulmonary:      Effort: Pulmonary effort is normal.      Breath sounds: Normal breath sounds. Abdominal:      General: Abdomen is flat. Bowel sounds are normal.      Palpations: Abdomen is soft. Musculoskeletal: Normal range of motion. Skin:     General: Skin is warm and dry. Capillary Refill: Capillary refill takes less than 2 seconds. Neurological:      General: No focal deficit present. Mental Status: She is alert and oriented to person, place, and time. Mental status is at baseline.    Psychiatric:         Cognition and Memory: Cognition and memory normal.      Comments: Mini cog 4/5 cervical cancer screen. Referral made to: Jocelyn Natarajan MD, OB/GYN, [facility]      RTC:  Return in about 3 months (around 8/14/2021) for routine visit. Time spent:  30 Minutes  I have reviewed my findings and recommendations with Elijah Seals and Dr. Austen Yates.     Jordan Marin MD   5/14/2021 2:44 PM

## 2021-06-01 ENCOUNTER — HOSPITAL ENCOUNTER (OUTPATIENT)
Age: 61
Discharge: HOME | End: 2021-06-01
Payer: MEDICAID

## 2021-06-01 DIAGNOSIS — E55.9 VITAMIN D DEFICIENCY: ICD-10-CM

## 2021-06-01 DIAGNOSIS — D64.9 NORMOCYTIC ANEMIA: ICD-10-CM

## 2021-06-01 DIAGNOSIS — I10 ESSENTIAL HYPERTENSION: ICD-10-CM

## 2021-06-01 LAB
ANION GAP SERPL CALCULATED.3IONS-SCNC: 11 MMOL/L (ref 7–16)
BASOPHILS ABSOLUTE: 0.03 E9/L (ref 0–0.2)
BASOPHILS RELATIVE PERCENT: 0.5 % (ref 0–2)
BUN BLDV-MCNC: 21 MG/DL (ref 6–23)
CALCIUM SERPL-MCNC: 9.6 MG/DL (ref 8.6–10.2)
CHLORIDE BLD-SCNC: 102 MMOL/L (ref 98–107)
CO2: 24 MMOL/L (ref 22–29)
CREAT SERPL-MCNC: 1.5 MG/DL (ref 0.5–1)
EOSINOPHILS ABSOLUTE: 0.06 E9/L (ref 0.05–0.5)
EOSINOPHILS RELATIVE PERCENT: 0.9 % (ref 0–6)
FERRITIN: 15 NG/ML
GFR AFRICAN AMERICAN: 43
GFR NON-AFRICAN AMERICAN: 35 ML/MIN/1.73
GLUCOSE BLD-MCNC: 100 MG/DL (ref 74–99)
HCT VFR BLD CALC: 30.4 % (ref 34–48)
HEMOGLOBIN: 9.9 G/DL (ref 11.5–15.5)
IMMATURE GRANULOCYTES #: 0.02 E9/L
IMMATURE GRANULOCYTES %: 0.3 % (ref 0–5)
IRON SATURATION: 31 % (ref 15–50)
IRON: 132 MCG/DL (ref 37–145)
LYMPHOCYTES ABSOLUTE: 1.85 E9/L (ref 1.5–4)
LYMPHOCYTES RELATIVE PERCENT: 28.4 % (ref 20–42)
MCH RBC QN AUTO: 30.3 PG (ref 26–35)
MCHC RBC AUTO-ENTMCNC: 32.6 % (ref 32–34.5)
MCV RBC AUTO: 93 FL (ref 80–99.9)
MONOCYTES ABSOLUTE: 0.71 E9/L (ref 0.1–0.95)
MONOCYTES RELATIVE PERCENT: 10.9 % (ref 2–12)
NEUTROPHILS ABSOLUTE: 3.84 E9/L (ref 1.8–7.3)
NEUTROPHILS RELATIVE PERCENT: 59 % (ref 43–80)
PDW BLD-RTO: 13.2 FL (ref 11.5–15)
PLATELET # BLD: 399 E9/L (ref 130–450)
PMV BLD AUTO: 9.6 FL (ref 7–12)
POTASSIUM SERPL-SCNC: 4.9 MMOL/L (ref 3.5–5)
RBC # BLD: 3.27 E12/L (ref 3.5–5.5)
SODIUM BLD-SCNC: 137 MMOL/L (ref 132–146)
TOTAL IRON BINDING CAPACITY: 423 MCG/DL (ref 250–450)
VITAMIN D 25-HYDROXY: 42 NG/ML (ref 30–100)
WBC # BLD: 6.5 E9/L (ref 4.5–11.5)

## 2021-06-01 PROCEDURE — 82607 VITAMIN B-12: CPT

## 2021-06-01 PROCEDURE — 82728 ASSAY OF FERRITIN: CPT

## 2021-06-01 PROCEDURE — 83540 ASSAY OF IRON: CPT

## 2021-06-01 PROCEDURE — 85025 COMPLETE CBC W/AUTO DIFF WBC: CPT

## 2021-06-01 PROCEDURE — 82746 ASSAY OF FOLIC ACID SERUM: CPT

## 2021-06-01 PROCEDURE — 36415 COLL VENOUS BLD VENIPUNCTURE: CPT

## 2021-06-01 PROCEDURE — 80048 BASIC METABOLIC PNL TOTAL CA: CPT

## 2021-06-01 PROCEDURE — 82306 VITAMIN D 25 HYDROXY: CPT

## 2021-06-01 PROCEDURE — 83550 IRON BINDING TEST: CPT

## 2021-06-03 LAB
FOLATE: 13.7 NG/ML (ref 4.8–24.2)
VITAMIN B-12: 155 PG/ML (ref 211–946)

## 2021-07-22 DIAGNOSIS — E11.8 TYPE 2 DIABETES MELLITUS WITH COMPLICATION, WITHOUT LONG-TERM CURRENT USE OF INSULIN (HCC): ICD-10-CM

## 2021-08-20 ENCOUNTER — HOSPITAL ENCOUNTER (OUTPATIENT)
Age: 61
Discharge: HOME | End: 2021-08-20
Payer: MEDICAID

## 2021-08-20 ENCOUNTER — OFFICE VISIT (OUTPATIENT)
Dept: INTERNAL MEDICINE | Age: 61
End: 2021-08-20
Payer: MEDICAID

## 2021-08-20 VITALS
TEMPERATURE: 98.7 F | SYSTOLIC BLOOD PRESSURE: 108 MMHG | WEIGHT: 159.8 LBS | HEIGHT: 60 IN | HEART RATE: 73 BPM | BODY MASS INDEX: 31.37 KG/M2 | DIASTOLIC BLOOD PRESSURE: 56 MMHG | RESPIRATION RATE: 18 BRPM

## 2021-08-20 DIAGNOSIS — M25.541 JOINT PAIN IN BOTH HANDS: ICD-10-CM

## 2021-08-20 DIAGNOSIS — E11.22 TYPE 2 DIABETES MELLITUS WITH STAGE 3B CHRONIC KIDNEY DISEASE, WITHOUT LONG-TERM CURRENT USE OF INSULIN (HCC): ICD-10-CM

## 2021-08-20 DIAGNOSIS — N18.32 TYPE 2 DIABETES MELLITUS WITH STAGE 3B CHRONIC KIDNEY DISEASE, WITHOUT LONG-TERM CURRENT USE OF INSULIN (HCC): ICD-10-CM

## 2021-08-20 DIAGNOSIS — E11.8 TYPE 2 DIABETES MELLITUS WITH COMPLICATION, WITHOUT LONG-TERM CURRENT USE OF INSULIN (HCC): ICD-10-CM

## 2021-08-20 DIAGNOSIS — I10 ESSENTIAL HYPERTENSION: ICD-10-CM

## 2021-08-20 DIAGNOSIS — N18.32 ANEMIA DUE TO STAGE 3B CHRONIC KIDNEY DISEASE (HCC): ICD-10-CM

## 2021-08-20 DIAGNOSIS — E53.8 VITAMIN B 12 DEFICIENCY: ICD-10-CM

## 2021-08-20 DIAGNOSIS — D63.1 ANEMIA DUE TO STAGE 3B CHRONIC KIDNEY DISEASE (HCC): ICD-10-CM

## 2021-08-20 DIAGNOSIS — E55.9 VITAMIN D DEFICIENCY: ICD-10-CM

## 2021-08-20 DIAGNOSIS — E78.2 MIXED HYPERLIPIDEMIA: Chronic | ICD-10-CM

## 2021-08-20 DIAGNOSIS — M25.542 JOINT PAIN IN BOTH HANDS: ICD-10-CM

## 2021-08-20 DIAGNOSIS — Z12.31 ENCOUNTER FOR SCREENING MAMMOGRAM FOR MALIGNANT NEOPLASM OF BREAST: Primary | ICD-10-CM

## 2021-08-20 DIAGNOSIS — E03.9 ACQUIRED HYPOTHYROIDISM: Chronic | ICD-10-CM

## 2021-08-20 PROBLEM — E11.29 TYPE 2 DIABETES MELLITUS WITH KIDNEY COMPLICATION, WITHOUT LONG-TERM CURRENT USE OF INSULIN (HCC): Status: ACTIVE | Noted: 2021-08-20

## 2021-08-20 LAB
ANION GAP SERPL CALCULATED.3IONS-SCNC: 12 MMOL/L (ref 7–16)
BASOPHILS ABSOLUTE: 0.04 E9/L (ref 0–0.2)
BASOPHILS RELATIVE PERCENT: 0.6 % (ref 0–2)
BUN BLDV-MCNC: 14 MG/DL (ref 6–23)
C-REACTIVE PROTEIN: 0.3 MG/DL (ref 0–0.4)
CALCIUM SERPL-MCNC: 9.7 MG/DL (ref 8.6–10.2)
CHLORIDE BLD-SCNC: 102 MMOL/L (ref 98–107)
CHOLESTEROL, TOTAL: 123 MG/DL (ref 0–199)
CO2: 25 MMOL/L (ref 22–29)
CREAT SERPL-MCNC: 1.2 MG/DL (ref 0.5–1)
EOSINOPHILS ABSOLUTE: 0.11 E9/L (ref 0.05–0.5)
EOSINOPHILS RELATIVE PERCENT: 1.7 % (ref 0–6)
GFR AFRICAN AMERICAN: 55
GFR NON-AFRICAN AMERICAN: 46 ML/MIN/1.73
GLUCOSE BLD-MCNC: 68 MG/DL (ref 74–99)
HBA1C MFR BLD: 5.6 %
HCT VFR BLD CALC: 29.7 % (ref 34–48)
HDLC SERPL-MCNC: 38 MG/DL
HEMOGLOBIN: 9.8 G/DL (ref 11.5–15.5)
IMMATURE GRANULOCYTES #: 0.02 E9/L
IMMATURE GRANULOCYTES %: 0.3 % (ref 0–5)
LDL CHOLESTEROL CALCULATED: 9 MG/DL (ref 0–99)
LYMPHOCYTES ABSOLUTE: 1.71 E9/L (ref 1.5–4)
LYMPHOCYTES RELATIVE PERCENT: 26.2 % (ref 20–42)
MCH RBC QN AUTO: 30.5 PG (ref 26–35)
MCHC RBC AUTO-ENTMCNC: 33 % (ref 32–34.5)
MCV RBC AUTO: 92.5 FL (ref 80–99.9)
MONOCYTES ABSOLUTE: 0.76 E9/L (ref 0.1–0.95)
MONOCYTES RELATIVE PERCENT: 11.6 % (ref 2–12)
NEUTROPHILS ABSOLUTE: 3.89 E9/L (ref 1.8–7.3)
NEUTROPHILS RELATIVE PERCENT: 59.6 % (ref 43–80)
PDW BLD-RTO: 12.5 FL (ref 11.5–15)
PLATELET # BLD: 365 E9/L (ref 130–450)
PMV BLD AUTO: 9.3 FL (ref 7–12)
POTASSIUM SERPL-SCNC: 4.6 MMOL/L (ref 3.5–5)
RBC # BLD: 3.21 E12/L (ref 3.5–5.5)
SEDIMENTATION RATE, ERYTHROCYTE: 40 MM/HR (ref 0–20)
SODIUM BLD-SCNC: 139 MMOL/L (ref 132–146)
TRIGL SERPL-MCNC: 380 MG/DL (ref 0–149)
VLDLC SERPL CALC-MCNC: 76 MG/DL
WBC # BLD: 6.5 E9/L (ref 4.5–11.5)

## 2021-08-20 PROCEDURE — 3044F HG A1C LEVEL LT 7.0%: CPT | Performed by: STUDENT IN AN ORGANIZED HEALTH CARE EDUCATION/TRAINING PROGRAM

## 2021-08-20 PROCEDURE — 36415 COLL VENOUS BLD VENIPUNCTURE: CPT

## 2021-08-20 PROCEDURE — 85651 RBC SED RATE NONAUTOMATED: CPT

## 2021-08-20 PROCEDURE — 99214 OFFICE O/P EST MOD 30 MIN: CPT | Performed by: STUDENT IN AN ORGANIZED HEALTH CARE EDUCATION/TRAINING PROGRAM

## 2021-08-20 PROCEDURE — 3017F COLORECTAL CA SCREEN DOC REV: CPT | Performed by: STUDENT IN AN ORGANIZED HEALTH CARE EDUCATION/TRAINING PROGRAM

## 2021-08-20 PROCEDURE — 86140 C-REACTIVE PROTEIN: CPT

## 2021-08-20 PROCEDURE — 83036 HEMOGLOBIN GLYCOSYLATED A1C: CPT | Performed by: STUDENT IN AN ORGANIZED HEALTH CARE EDUCATION/TRAINING PROGRAM

## 2021-08-20 PROCEDURE — G8417 CALC BMI ABV UP PARAM F/U: HCPCS | Performed by: STUDENT IN AN ORGANIZED HEALTH CARE EDUCATION/TRAINING PROGRAM

## 2021-08-20 PROCEDURE — 1036F TOBACCO NON-USER: CPT | Performed by: STUDENT IN AN ORGANIZED HEALTH CARE EDUCATION/TRAINING PROGRAM

## 2021-08-20 PROCEDURE — G8427 DOCREV CUR MEDS BY ELIG CLIN: HCPCS | Performed by: STUDENT IN AN ORGANIZED HEALTH CARE EDUCATION/TRAINING PROGRAM

## 2021-08-20 PROCEDURE — 99212 OFFICE O/P EST SF 10 MIN: CPT | Performed by: STUDENT IN AN ORGANIZED HEALTH CARE EDUCATION/TRAINING PROGRAM

## 2021-08-20 PROCEDURE — 80061 LIPID PANEL: CPT

## 2021-08-20 PROCEDURE — 80048 BASIC METABOLIC PNL TOTAL CA: CPT

## 2021-08-20 PROCEDURE — 84165 PROTEIN E-PHORESIS SERUM: CPT

## 2021-08-20 PROCEDURE — 85025 COMPLETE CBC W/AUTO DIFF WBC: CPT

## 2021-08-20 PROCEDURE — 2022F DILAT RTA XM EVC RTNOPTHY: CPT | Performed by: STUDENT IN AN ORGANIZED HEALTH CARE EDUCATION/TRAINING PROGRAM

## 2021-08-20 RX ORDER — SPIRONOLACTONE 50 MG/1
50 TABLET, FILM COATED ORAL DAILY
Qty: 30 TABLET | Refills: 1 | Status: SHIPPED
Start: 2021-08-20 | End: 2021-11-15

## 2021-08-20 RX ORDER — LISINOPRIL 40 MG/1
TABLET ORAL
Qty: 30 TABLET | Refills: 2 | Status: SHIPPED | OUTPATIENT
Start: 2021-08-20

## 2021-08-20 RX ORDER — GLUCOSAMINE HCL/CHONDROITIN SU 500-400 MG
CAPSULE ORAL
Qty: 120 STRIP | Refills: 3 | Status: SHIPPED | OUTPATIENT
Start: 2021-08-20

## 2021-08-20 RX ORDER — LEVOTHYROXINE SODIUM 0.1 MG/1
100 TABLET ORAL DAILY
Qty: 30 TABLET | Refills: 1 | Status: SHIPPED | OUTPATIENT
Start: 2021-08-20 | End: 2021-11-18

## 2021-08-20 RX ORDER — UBIDECARENONE 75 MG
100 CAPSULE ORAL DAILY
Qty: 30 TABLET | Refills: 3 | Status: SHIPPED | OUTPATIENT
Start: 2021-08-20 | End: 2022-08-20

## 2021-08-20 RX ORDER — MELATONIN
Qty: 30 TABLET | Status: CANCELLED | OUTPATIENT
Start: 2021-08-20

## 2021-08-20 RX ORDER — LANCETS 33 GAUGE
EACH MISCELLANEOUS
Qty: 100 EACH | Refills: 3 | Status: SHIPPED | OUTPATIENT
Start: 2021-08-20

## 2021-08-20 RX ORDER — MELATONIN
Qty: 60 TABLET | Refills: 2 | Status: SHIPPED | OUTPATIENT
Start: 2021-08-20

## 2021-08-20 RX ORDER — SIMVASTATIN 20 MG
20 TABLET ORAL NIGHTLY
Qty: 30 TABLET | Refills: 1 | Status: SHIPPED | OUTPATIENT
Start: 2021-08-20 | End: 2021-11-18

## 2021-08-20 RX ORDER — HYDROCHLOROTHIAZIDE 25 MG/1
25 TABLET ORAL DAILY
Qty: 30 TABLET | Refills: 1 | Status: SHIPPED
Start: 2021-08-20 | End: 2021-11-15

## 2021-08-20 RX ORDER — ASPIRIN 81 MG/1
81 TABLET ORAL DAILY
Qty: 90 TABLET | Refills: 1 | Status: SHIPPED | OUTPATIENT
Start: 2021-08-20 | End: 2021-11-18

## 2021-08-20 RX ORDER — HYDRALAZINE HYDROCHLORIDE 100 MG/1
100 TABLET, FILM COATED ORAL 3 TIMES DAILY
Qty: 90 TABLET | Refills: 1 | Status: SHIPPED
Start: 2021-08-20 | End: 2021-11-15

## 2021-08-20 RX ORDER — AMLODIPINE BESYLATE 10 MG/1
10 TABLET ORAL DAILY
Qty: 30 TABLET | Refills: 1 | Status: SHIPPED
Start: 2021-08-20 | End: 2021-11-15

## 2021-08-20 ASSESSMENT — ENCOUNTER SYMPTOMS
CONSTIPATION: 1
BLOOD IN STOOL: 0
SHORTNESS OF BREATH: 0
COUGH: 0
VOMITING: 0
DIARRHEA: 0
NAUSEA: 0

## 2021-08-20 NOTE — PATIENT INSTRUCTIONS
· Take all your medications regularly  · Please get your blood, Urine and ultrasound of the kidney done before next visit  · Please get your done mammogram   · Please start taking Vit D and Vit B 12 supplement daily  · Please monitor your blood pressure 3 times a week and record it. · Please Monitor your blood glucose level and record it. · Please exercise (example walking) 30 min every day for 5 days a week.   · If your symptoms get worse Please go to nearest ED

## 2021-08-20 NOTE — PROGRESS NOTES
[facility]  [facility] Medicine Residency Clinic    Attending Physician Statement  I have discussed the case, including pertinent history and exam findings with the resident physician. I have seen and examined the patient and the key elements of the encounter have been performed by me. I agree with the assessment, plan and orders as documented by the resident. I have reviewed all pertinent PMHx, PSHx, FamHx, SocialHx, medications, and allergies and updated history as appropriate. Patient presents for routine follow up of medical problems. Has DM, not on insulin which is controlled and HTN which is also controlled (but on 5 meds therefore need to consider secondary etiology). She appears to have CKD stage 3 (new since 2019, no labs in 2020) and needs w/u. Will check US of kidneys and labs, ESR, CRP, BARBARA, SPEP and UPEP, especially given anemia. She has hypothryoidism on synthroid, vit D deficiency and B12 deficiency. Remainder of medical problems as per resident note.     Preeti Castillo DO  8/20/2021 10:28 AM

## 2021-08-20 NOTE — PROGRESS NOTES
[facility] Internal Medicine      SUBJECTIVE:  Vinayak Jacobo (:  1960) is a 63 y.o. female here for the refill of all her medications. Patient brought her blood sugar diary today and Fasting blood glucose in August is between 121-179. Patient said that she is taking Metformin regularly. Patient said no hypoglycemic recently; No recent episode of diarrhea. Patient brought her BP diary with her and her august BP is, For systolic its between 527-344 and diastolic BP is between 50-14. She mentioned she takes all her BP medications regularly. Pain in her hands, shoulder and knee for last for last 2 months. That worse with activity, patient is taking arthritis cream and she said its helping. No other OTC medications. Pains are worse in the evening. No joint swelling noticed. Patient is Sinhala speaking. History is obtained with the help of  ALEYDA#[id number]. 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 HENT: Negative. Eyes: Negative for visual disturbance. Respiratory: Negative for cough and shortness of breath. Cardiovascular: Negative for chest pain, palpitations and leg swelling. Gastrointestinal: Positive for constipation. Negative for blood in stool, diarrhea, nausea and vomiting. Musculoskeletal: Positive for arthralgias. Neurological: Negative for dizziness and light-headedness.        Current Outpatient Medications on File Prior to Visit   Medication Sig Dispense Refill    Blood Pressure Monitoring (BLOOD PRESSURE CUFF) MISC Dx:  Hypertension with labile blood pressure 1 each 0    latanoprost (XALATAN) 0.005 % ophthalmic solution Place 1 drop into both eyes nightly 1 Bottle 1    Misc. Devices MISC Monitor Blood Pressure daily for Hypertension 1 Device 0    acetaminophen (AMINOFEN) 325 MG tablet Take 1 tablet by mouth every 4 hours as needed for Pain 60 tablet 1     No current facility-administered medications on file prior to visit. OBJECTIVE:    VS:   Vitals:    08/20/21 0924   BP: (!) 108/56   Site: Right Upper Arm   Position: Sitting   Cuff Size: Medium Adult   Pulse: 73   Resp: 18   Temp: 98.7 °F (37.1 °C)   TempSrc: Oral   Weight: 159 lb 12.8 oz (72.5 kg)   Height: 5' (1.524 m)     Physical Exam  Constitutional:       Appearance: Normal appearance. She is obese. HENT:      Head: Normocephalic and atraumatic. Right Ear: Tympanic membrane normal.      Left Ear: Tympanic membrane normal.      Nose: Nose normal.      Mouth/Throat:      Mouth: Mucous membranes are moist.   Eyes:      Pupils: Pupils are equal, round, and reactive to light. Cardiovascular:      Rate and Rhythm: Normal rate and regular rhythm. Pulses: Normal pulses. Pulmonary:      Breath sounds: Normal breath sounds. Abdominal:      General: Bowel sounds are normal.   Musculoskeletal:         General: No swelling or tenderness. Normal range of motion. Cervical back: Normal range of motion. Skin:     General: Skin is warm. Neurological:      General: No focal deficit present. Mental Status: She is alert.             ASSESSMENT/PLAN:      Hypertension  · Amlodipine 10 mg oral daily  · Hydralazine 100 mg oral 3 times daily  · Hydrochlorothiazide 25 mg oral daily  · Lisinopril 40 mg  · Aldactone 50 mg oral daily  · Aspirin 81 mg daily  · BP today 108/56  · Will continue this medications  · As this patient is taking more than 3 medication so for next visit, we will consider work up for secondary cause of hypertension. Obesity  · BMI 30.66 kg/m²  · STOP-BANG score 2.0; low risk of VISHNU at this point    Hyperlipidemia  · Simvastatin 20 mg nightly  · Total cholesterol 115, LDL 46, HDL 39,  on 01/08/2021  · Lipid panel ordered today; will follow the result      Hypothyroidism   · Synthroid 100 MCG daily  · TSH 0.411 on 01/08/2021  · Will continue the same dose of synthroid and will follow with TSH next visit      Type II DM   · Metformin 1000 mg daily  · Creatinine 1.5, GFR 35 on 06/01/2021   · HbA1c 5.6% on 0/20/2021  · No diabetic eye exam or Podiatries visit; advised for scheduling       Vitamin D deficiency  · Vitamin D 42 ng/ml on  06/01/2021   · Vit D 2000 IU daily  · Will follow Vit D level in next visit      Normocytic normochromic anemia 2/2  · Hemoglobin 9.9, MCV 93 06/01/2021  · Vitamin B-12 155 pg/mL (low), folate 13.7 ng/mL 06/01/2021  · iron 132 mcg/dL, TIBC 423 mcg/dL, iron saturation 31% , ferritin 15 ng/mL on 06/01/2021      Multiple joint pain 2/2 osteoarthritis? Vs RA? Vs MM?  · CBC, ESR,CRP, BARBARA,SPEP and UPEP ordered      CKD stage III (new onset) 2/2 DM vs Hypertension Vs MM?  · Creatinine 1.5, GFR 35 on 06/01/2021   · Repeat BMP, UPEP and SPEP ordered  · USG of kidney ordered  · Will refer to the nephrologist when creatinine is 2-2.5      Vitamin B12 deficiency  · On last visit mini cog was 4/5, vitamin B12 155 pg/ml low on 06/01/2021  · Vit B 12 100 mcg daily ordred  · Will follow up with Vit B12 level in 3 months    Glaucoma   · Evaluated by eye doctor   · Latanoprost eyedrop both eyes nightly      RTC:  Follow up after 4 weeks    Time spent: 61 Minutes  I have reviewed my findings and recommendations with Ness Aguirre and Dr. Patience Mcconnell.     Melissa Sullivan MD   8/20/2021 11:29 AM

## 2021-08-20 NOTE — PROGRESS NOTES
[facility] interpretor #[id number]  Used for this appointment  All instructions reviewed with patient by dr Emir Price   Printed lab scripts given to patient   Patient given instructions. Understanding verbalized.  Fu appointment scheduled and reviewed with pt

## 2021-08-23 LAB
ALBUMIN SERPL-MCNC: 3.8 G/DL (ref 3.5–4.7)
ALPHA-1-GLOBULIN: 0.2 G/DL (ref 0.2–0.4)
ALPHA-2-GLOBULIN: 0.9 G/FL (ref 0.5–1)
BETA GLOBULIN: 1.1 G/DL (ref 0.8–1.3)
ELECTROPHORESIS: NORMAL
GAMMA GLOBULIN: 1.4 G/DL (ref 0.7–1.6)
TOTAL PROTEIN: 7.3 G/DL (ref 6.4–8.3)

## 2021-09-23 ENCOUNTER — TELEPHONE (OUTPATIENT)
Dept: INTERNAL MEDICINE | Age: 61
End: 2021-09-23

## 2021-09-27 ENCOUNTER — TELEPHONE (OUTPATIENT)
Dept: INTERNAL MEDICINE | Age: 61
End: 2021-09-27

## 2021-09-27 NOTE — TELEPHONE ENCOUNTER
Unable to leave a message regarding missed appt due to mailbox is full    Left a message on daughters silva phone that pt has an appt for mammogram for 09/28/2021 at 1030 at the [facility] care [facility] also has an appt for ultrasound on 10/06/2021 at 100  No show lletter mailed

## 2021-09-27 NOTE — LETTER
[facility] Internal Medicine Office   [address] [facility], [address] [place] [address]  Phone: [phone]  Fax: [phone]      9/27/2021  [address] [place] [address] [address] [id number]       Dear Aida Huber:    We are sorry you missed your appointment with on 09/23/2021  Your health and follow-up medical care are important to us. Please call our office as soon as possible at [phone] so that we may reschedule your appointment. Please note that if you have three cancelled appointments or do not show for three consecutive appointments, no medication refills or paperwork requests will be honored until an appointment is scheduled and completed. If you have already rescheduled your appointment, please disregard this letter. We look forward to seeing you soon.        Sincerely,         Ld Figueroa LPN

## 2021-11-13 DIAGNOSIS — E11.8 TYPE 2 DIABETES MELLITUS WITH COMPLICATION, WITHOUT LONG-TERM CURRENT USE OF INSULIN (HCC): ICD-10-CM

## 2021-11-13 DIAGNOSIS — I10 ESSENTIAL HYPERTENSION: ICD-10-CM

## 2021-11-15 RX ORDER — HYDROCHLOROTHIAZIDE 25 MG/1
25 TABLET ORAL DAILY
Qty: 30 TABLET | Refills: 1 | Status: SHIPPED
Start: 2021-11-15 | End: 2022-02-01 | Stop reason: SDUPTHER

## 2021-11-15 RX ORDER — HYDRALAZINE HYDROCHLORIDE 100 MG/1
100 TABLET, FILM COATED ORAL 3 TIMES DAILY
Qty: 90 TABLET | Refills: 1 | Status: SHIPPED
Start: 2021-11-15 | End: 2022-02-01 | Stop reason: SDUPTHER

## 2021-11-15 RX ORDER — SPIRONOLACTONE 50 MG/1
50 TABLET, FILM COATED ORAL DAILY
Qty: 30 TABLET | Refills: 1 | Status: SHIPPED
Start: 2021-11-15 | End: 2022-02-01 | Stop reason: SDUPTHER

## 2021-11-15 RX ORDER — AMLODIPINE BESYLATE 10 MG/1
10 TABLET ORAL DAILY
Qty: 30 TABLET | Refills: 1 | Status: SHIPPED
Start: 2021-11-15 | End: 2022-02-01 | Stop reason: SDUPTHER

## 2022-02-01 DIAGNOSIS — I10 ESSENTIAL HYPERTENSION: ICD-10-CM

## 2022-02-01 DIAGNOSIS — E11.8 TYPE 2 DIABETES MELLITUS WITH COMPLICATION, WITHOUT LONG-TERM CURRENT USE OF INSULIN (HCC): ICD-10-CM

## 2022-02-01 RX ORDER — HYDROCHLOROTHIAZIDE 25 MG/1
25 TABLET ORAL DAILY
Qty: 30 TABLET | Refills: 0 | Status: SHIPPED | OUTPATIENT
Start: 2022-02-01 | End: 2022-03-03

## 2022-02-01 RX ORDER — AMLODIPINE BESYLATE 10 MG/1
10 TABLET ORAL DAILY
Qty: 30 TABLET | Refills: 0 | Status: SHIPPED | OUTPATIENT
Start: 2022-02-01 | End: 2022-03-03

## 2022-02-01 RX ORDER — SPIRONOLACTONE 50 MG/1
50 TABLET, FILM COATED ORAL DAILY
Qty: 30 TABLET | Refills: 0 | Status: SHIPPED | OUTPATIENT
Start: 2022-02-01 | End: 2022-03-03

## 2022-02-01 RX ORDER — HYDRALAZINE HYDROCHLORIDE 100 MG/1
100 TABLET, FILM COATED ORAL 3 TIMES DAILY
Qty: 90 TABLET | Refills: 0 | Status: SHIPPED | OUTPATIENT
Start: 2022-02-01 | End: 2022-03-03

## 2022-02-11 ENCOUNTER — TELEPHONE (OUTPATIENT)
Dept: INTERNAL MEDICINE | Age: 62
End: 2022-02-11

## 2022-02-11 NOTE — TELEPHONE ENCOUNTER
Spoke with patients daughter[ Guillermo Russell [[phone]] regarding mothers, Eric Mcginnis missed appointments and need for refills. Cheryl states mother is currently in [facility] for extended visit and not sure when she will be returning. Discussed with Cheryl, that we do not have a working phone number for mother and she states that mother does not have a phone. Cheryl instructed to call us when mother returns and we can schedule her an appointment. We need to see her to continue medications. Cheryl verbalizes understanding, not sure how long mother will be gone.

## 2022-03-06 DIAGNOSIS — I10 ESSENTIAL HYPERTENSION: ICD-10-CM

## 2022-03-07 ENCOUNTER — TELEPHONE (OUTPATIENT)
Dept: INTERNAL MEDICINE | Age: 62
End: 2022-03-07

## 2022-03-07 DIAGNOSIS — E11.8 TYPE 2 DIABETES MELLITUS WITH COMPLICATION, WITHOUT LONG-TERM CURRENT USE OF INSULIN (HCC): ICD-10-CM

## 2022-03-07 RX ORDER — AMLODIPINE BESYLATE 10 MG/1
10 TABLET ORAL DAILY
Qty: 30 TABLET | Refills: 0 | OUTPATIENT
Start: 2022-03-07 | End: 2022-04-06

## 2022-03-07 RX ORDER — HYDRALAZINE HYDROCHLORIDE 100 MG/1
100 TABLET, FILM COATED ORAL 3 TIMES DAILY
Qty: 90 TABLET | Refills: 0 | OUTPATIENT
Start: 2022-03-07 | End: 2022-04-06

## 2022-03-07 RX ORDER — HYDROCHLOROTHIAZIDE 25 MG/1
25 TABLET ORAL DAILY
Qty: 30 TABLET | Refills: 0 | OUTPATIENT
Start: 2022-03-07 | End: 2022-04-06

## 2022-03-07 RX ORDER — SPIRONOLACTONE 50 MG/1
50 TABLET, FILM COATED ORAL DAILY
Qty: 30 TABLET | Refills: 0 | OUTPATIENT
Start: 2022-03-07 | End: 2022-04-06

## 2022-03-07 NOTE — TELEPHONE ENCOUNTER
Received refill requests but patient has not been seen for more than 6 months and does not have a future appt scheduled  Attempted to reach pt but her phone is out of service times 2 calls and did leave a message with pts daughter Usman Sam to have her mother call the office  Patient did mention that her mother was in [place] at her last in office appt

## 2024-10-16 ENCOUNTER — HOSPITAL ENCOUNTER (OUTPATIENT)
Age: 64
Setting detail: OBSERVATION
Discharge: HOME | End: 2024-10-18
Attending: EMERGENCY MEDICINE | Admitting: INTERNAL MEDICINE
Payer: MEDICAID

## 2024-10-16 ENCOUNTER — APPOINTMENT (OUTPATIENT)
Dept: GENERAL RADIOLOGY | Age: 64
End: 2024-10-16
Payer: MEDICAID

## 2024-10-16 ENCOUNTER — APPOINTMENT (OUTPATIENT)
Dept: CT IMAGING | Age: 64
End: 2024-10-16
Payer: MEDICAID

## 2024-10-16 DIAGNOSIS — Z98.890 S/P CRANIOTOMY: Chronic | ICD-10-CM

## 2024-10-16 DIAGNOSIS — R56.9 SEIZURE (HCC): Primary | ICD-10-CM

## 2024-10-16 DIAGNOSIS — R56.9 NEW ONSET SEIZURE (HCC): ICD-10-CM

## 2024-10-16 DIAGNOSIS — E11.8 TYPE 2 DIABETES MELLITUS WITH COMPLICATION, WITHOUT LONG-TERM CURRENT USE OF INSULIN (HCC): ICD-10-CM

## 2024-10-16 DIAGNOSIS — I10 ESSENTIAL HYPERTENSION: ICD-10-CM

## 2024-10-16 DIAGNOSIS — E55.9 VITAMIN D DEFICIENCY: ICD-10-CM

## 2024-10-16 DIAGNOSIS — E03.9 ACQUIRED HYPOTHYROIDISM: Chronic | ICD-10-CM

## 2024-10-16 LAB
25(OH)D3 SERPL-MCNC: 36.5 NG/ML (ref 30–100)
ALBUMIN SERPL-MCNC: 4.4 G/DL (ref 3.5–5.2)
ALP SERPL-CCNC: 91 U/L (ref 35–104)
ALT SERPL-CCNC: 11 U/L (ref 0–32)
ANION GAP SERPL CALCULATED.3IONS-SCNC: 10 MMOL/L (ref 7–16)
APAP SERPL-MCNC: <5 UG/ML (ref 10–30)
AST SERPL-CCNC: 18 U/L (ref 0–31)
BASOPHILS # BLD: 0.04 K/UL (ref 0–0.2)
BASOPHILS NFR BLD: 1 % (ref 0–2)
BILIRUB SERPL-MCNC: 0.2 MG/DL (ref 0–1.2)
BUN SERPL-MCNC: 21 MG/DL (ref 6–23)
CALCIUM SERPL-MCNC: 9.2 MG/DL (ref 8.6–10.2)
CHLORIDE SERPL-SCNC: 100 MMOL/L (ref 98–107)
CO2 SERPL-SCNC: 26 MMOL/L (ref 22–29)
CREAT SERPL-MCNC: 1.1 MG/DL (ref 0.5–1)
EOSINOPHIL # BLD: 0.1 K/UL (ref 0.05–0.5)
EOSINOPHILS RELATIVE PERCENT: 2 % (ref 0–6)
ERYTHROCYTE [DISTWIDTH] IN BLOOD BY AUTOMATED COUNT: 12.9 % (ref 11.5–15)
ETHANOLAMINE SERPL-MCNC: <10 MG/DL (ref 0–0.08)
FOLATE SERPL-MCNC: 14.5 NG/ML (ref 4.8–24.2)
GFR, ESTIMATED: 59 ML/MIN/1.73M2
GLUCOSE BLD-MCNC: 158 MG/DL (ref 74–99)
GLUCOSE BLD-MCNC: 196 MG/DL (ref 74–99)
GLUCOSE SERPL-MCNC: 166 MG/DL (ref 74–99)
HCT VFR BLD AUTO: 40 % (ref 34–48)
HGB BLD-MCNC: 13.3 G/DL (ref 11.5–15.5)
IMM GRANULOCYTES # BLD AUTO: 0.03 K/UL (ref 0–0.58)
IMM GRANULOCYTES NFR BLD: 1 % (ref 0–5)
INR PPP: 1.1
LACTATE BLDV-SCNC: 3.4 MMOL/L (ref 0.5–2.2)
LYMPHOCYTES NFR BLD: 1.4 K/UL (ref 1.5–4)
LYMPHOCYTES RELATIVE PERCENT: 27 % (ref 20–42)
MCH RBC QN AUTO: 30 PG (ref 26–35)
MCHC RBC AUTO-ENTMCNC: 33.3 G/DL (ref 32–34.5)
MCV RBC AUTO: 90.3 FL (ref 80–99.9)
MONOCYTES NFR BLD: 0.47 K/UL (ref 0.1–0.95)
MONOCYTES NFR BLD: 9 % (ref 2–12)
NEUTROPHILS NFR BLD: 61 % (ref 43–80)
NEUTS SEG NFR BLD: 3.25 K/UL (ref 1.8–7.3)
PARTIAL THROMBOPLASTIN TIME: 25 SEC (ref 24.5–35.1)
PLATELET # BLD AUTO: 316 K/UL (ref 130–450)
PMV BLD AUTO: 10.1 FL (ref 7–12)
POTASSIUM SERPL-SCNC: 4.2 MMOL/L (ref 3.5–5)
PROT SERPL-MCNC: 7.8 G/DL (ref 6.4–8.3)
PROTHROMBIN TIME: 11.5 SEC (ref 9.3–12.4)
RBC # BLD AUTO: 4.43 M/UL (ref 3.5–5.5)
SALICYLATES SERPL-MCNC: <0.3 MG/DL (ref 0–30)
SODIUM SERPL-SCNC: 136 MMOL/L (ref 132–146)
T4 FREE SERPL-MCNC: 1 NG/DL (ref 0.9–1.7)
TOXIC TRICYCLIC SC,BLOOD: NEGATIVE
TROPONIN I SERPL HS-MCNC: 11 NG/L (ref 0–9)
TSH SERPL DL<=0.05 MIU/L-ACNC: 10.02 UIU/ML (ref 0.27–4.2)
VIT B12 SERPL-MCNC: 157 PG/ML (ref 211–946)
WBC OTHER # BLD: 5.3 K/UL (ref 4.5–11.5)

## 2024-10-16 PROCEDURE — 6360000002 HC RX W HCPCS: Performed by: EMERGENCY MEDICINE

## 2024-10-16 PROCEDURE — 96372 THER/PROPH/DIAG INJ SC/IM: CPT

## 2024-10-16 PROCEDURE — 82962 GLUCOSE BLOOD TEST: CPT

## 2024-10-16 PROCEDURE — 71045 X-RAY EXAM CHEST 1 VIEW: CPT

## 2024-10-16 PROCEDURE — 80179 DRUG ASSAY SALICYLATE: CPT

## 2024-10-16 PROCEDURE — G0378 HOSPITAL OBSERVATION PER HR: HCPCS

## 2024-10-16 PROCEDURE — 96374 THER/PROPH/DIAG INJ IV PUSH: CPT

## 2024-10-16 PROCEDURE — 70498 CT ANGIOGRAPHY NECK: CPT

## 2024-10-16 PROCEDURE — 70450 CT HEAD/BRAIN W/O DYE: CPT

## 2024-10-16 PROCEDURE — 85730 THROMBOPLASTIN TIME PARTIAL: CPT

## 2024-10-16 PROCEDURE — 84443 ASSAY THYROID STIM HORMONE: CPT

## 2024-10-16 PROCEDURE — 80053 COMPREHEN METABOLIC PANEL: CPT

## 2024-10-16 PROCEDURE — 99285 EMERGENCY DEPT VISIT HI MDM: CPT

## 2024-10-16 PROCEDURE — 80143 DRUG ASSAY ACETAMINOPHEN: CPT

## 2024-10-16 PROCEDURE — 6370000000 HC RX 637 (ALT 250 FOR IP)

## 2024-10-16 PROCEDURE — 99223 1ST HOSP IP/OBS HIGH 75: CPT | Performed by: INTERNAL MEDICINE

## 2024-10-16 PROCEDURE — 85610 PROTHROMBIN TIME: CPT

## 2024-10-16 PROCEDURE — 93005 ELECTROCARDIOGRAM TRACING: CPT | Performed by: EMERGENCY MEDICINE

## 2024-10-16 PROCEDURE — 6360000002 HC RX W HCPCS

## 2024-10-16 PROCEDURE — 82746 ASSAY OF FOLIC ACID SERUM: CPT

## 2024-10-16 PROCEDURE — 83605 ASSAY OF LACTIC ACID: CPT

## 2024-10-16 PROCEDURE — 82306 VITAMIN D 25 HYDROXY: CPT

## 2024-10-16 PROCEDURE — 82607 VITAMIN B-12: CPT

## 2024-10-16 PROCEDURE — 84484 ASSAY OF TROPONIN QUANT: CPT

## 2024-10-16 PROCEDURE — 80307 DRUG TEST PRSMV CHEM ANLYZR: CPT

## 2024-10-16 PROCEDURE — 85025 COMPLETE CBC W/AUTO DIFF WBC: CPT

## 2024-10-16 PROCEDURE — 6360000004 HC RX CONTRAST MEDICATION: Performed by: RADIOLOGY

## 2024-10-16 PROCEDURE — G0480 DRUG TEST DEF 1-7 CLASSES: HCPCS

## 2024-10-16 PROCEDURE — 2580000003 HC RX 258

## 2024-10-16 PROCEDURE — 36415 COLL VENOUS BLD VENIPUNCTURE: CPT

## 2024-10-16 PROCEDURE — 84439 ASSAY OF FREE THYROXINE: CPT

## 2024-10-16 PROCEDURE — 70496 CT ANGIOGRAPHY HEAD: CPT

## 2024-10-16 PROCEDURE — 72100 X-RAY EXAM L-S SPINE 2/3 VWS: CPT

## 2024-10-16 RX ORDER — MAGNESIUM SULFATE IN WATER 40 MG/ML
2000 INJECTION, SOLUTION INTRAVENOUS PRN
Status: DISCONTINUED | OUTPATIENT
Start: 2024-10-16 | End: 2024-10-18 | Stop reason: HOSPADM

## 2024-10-16 RX ORDER — SODIUM CHLORIDE 9 MG/ML
INJECTION, SOLUTION INTRAVENOUS PRN
Status: DISCONTINUED | OUTPATIENT
Start: 2024-10-16 | End: 2024-10-18 | Stop reason: HOSPADM

## 2024-10-16 RX ORDER — AMLODIPINE BESYLATE 10 MG/1
10 TABLET ORAL DAILY
Status: DISCONTINUED | OUTPATIENT
Start: 2024-10-16 | End: 2024-10-18 | Stop reason: HOSPADM

## 2024-10-16 RX ORDER — CYANOCOBALAMIN 1000 UG/ML
1000 INJECTION, SOLUTION INTRAMUSCULAR; SUBCUTANEOUS ONCE
Status: COMPLETED | OUTPATIENT
Start: 2024-10-16 | End: 2024-10-16

## 2024-10-16 RX ORDER — LEVETIRACETAM 500 MG/5ML
1500 INJECTION, SOLUTION, CONCENTRATE INTRAVENOUS ONCE
Status: COMPLETED | OUTPATIENT
Start: 2024-10-16 | End: 2024-10-16

## 2024-10-16 RX ORDER — SODIUM CHLORIDE 0.9 % (FLUSH) 0.9 %
5-40 SYRINGE (ML) INJECTION EVERY 12 HOURS SCHEDULED
Status: DISCONTINUED | OUTPATIENT
Start: 2024-10-16 | End: 2024-10-18 | Stop reason: HOSPADM

## 2024-10-16 RX ORDER — POLYETHYLENE GLYCOL 3350 17 G/17G
17 POWDER, FOR SOLUTION ORAL DAILY PRN
Status: DISCONTINUED | OUTPATIENT
Start: 2024-10-16 | End: 2024-10-18 | Stop reason: HOSPADM

## 2024-10-16 RX ORDER — ENOXAPARIN SODIUM 100 MG/ML
40 INJECTION SUBCUTANEOUS DAILY
Status: DISCONTINUED | OUTPATIENT
Start: 2024-10-16 | End: 2024-10-18 | Stop reason: HOSPADM

## 2024-10-16 RX ORDER — POTASSIUM CHLORIDE 7.45 MG/ML
10 INJECTION INTRAVENOUS PRN
Status: DISCONTINUED | OUTPATIENT
Start: 2024-10-16 | End: 2024-10-18 | Stop reason: HOSPADM

## 2024-10-16 RX ORDER — POTASSIUM CHLORIDE 1500 MG/1
40 TABLET, EXTENDED RELEASE ORAL PRN
Status: DISCONTINUED | OUTPATIENT
Start: 2024-10-16 | End: 2024-10-18 | Stop reason: HOSPADM

## 2024-10-16 RX ORDER — LEVETIRACETAM 500 MG/1
500 TABLET ORAL 2 TIMES DAILY
Status: DISCONTINUED | OUTPATIENT
Start: 2024-10-17 | End: 2024-10-18 | Stop reason: HOSPADM

## 2024-10-16 RX ORDER — ACETAMINOPHEN 325 MG/1
650 TABLET ORAL EVERY 6 HOURS PRN
Status: DISCONTINUED | OUTPATIENT
Start: 2024-10-16 | End: 2024-10-18 | Stop reason: HOSPADM

## 2024-10-16 RX ORDER — ONDANSETRON 2 MG/ML
4 INJECTION INTRAMUSCULAR; INTRAVENOUS EVERY 6 HOURS PRN
Status: DISCONTINUED | OUTPATIENT
Start: 2024-10-16 | End: 2024-10-18 | Stop reason: HOSPADM

## 2024-10-16 RX ORDER — IOPAMIDOL 755 MG/ML
60 INJECTION, SOLUTION INTRAVASCULAR
Status: COMPLETED | OUTPATIENT
Start: 2024-10-16 | End: 2024-10-16

## 2024-10-16 RX ORDER — SODIUM CHLORIDE 0.9 % (FLUSH) 0.9 %
5-40 SYRINGE (ML) INJECTION PRN
Status: DISCONTINUED | OUTPATIENT
Start: 2024-10-16 | End: 2024-10-18 | Stop reason: HOSPADM

## 2024-10-16 RX ORDER — ONDANSETRON 4 MG/1
4 TABLET, ORALLY DISINTEGRATING ORAL EVERY 8 HOURS PRN
Status: DISCONTINUED | OUTPATIENT
Start: 2024-10-16 | End: 2024-10-18 | Stop reason: HOSPADM

## 2024-10-16 RX ORDER — ACETAMINOPHEN 650 MG/1
650 SUPPOSITORY RECTAL EVERY 6 HOURS PRN
Status: DISCONTINUED | OUTPATIENT
Start: 2024-10-16 | End: 2024-10-18 | Stop reason: HOSPADM

## 2024-10-16 RX ADMIN — CYANOCOBALAMIN 1000 MCG: 1000 INJECTION, SOLUTION INTRAMUSCULAR; SUBCUTANEOUS at 20:47

## 2024-10-16 RX ADMIN — LEVETIRACETAM 1500 MG: 100 INJECTION INTRAVENOUS at 13:07

## 2024-10-16 RX ADMIN — SODIUM CHLORIDE, PRESERVATIVE FREE 10 ML: 5 INJECTION INTRAVENOUS at 20:47

## 2024-10-16 RX ADMIN — IOPAMIDOL 60 ML: 755 INJECTION, SOLUTION INTRAVENOUS at 13:32

## 2024-10-16 RX ADMIN — AMLODIPINE BESYLATE 10 MG: 10 TABLET ORAL at 18:41

## 2024-10-16 RX ADMIN — ENOXAPARIN SODIUM 40 MG: 100 INJECTION SUBCUTANEOUS at 18:41

## 2024-10-16 NOTE — H&P
[facility]  Internal Medicine Residency Program  History and Physical    Patient:  Cecilia Nichole 63 y.o. female MRN: [id number]     Date of Service: 10/16/2024    Hospital Day: 1      Chief complaint: had concerns including Seizures (EMS stated patient had fall with seizure like activity. Patient appeared to be postictal, but patient is Luxembourger speaking. Barrier in communication. ).    History of Present Illness   The patient is a 63 y.o. female with a PMHx of aneurysm s/p craniotomy, hypertension, obesity, hyperlipidemia, prediabetes, presented to the ED with concerns for seizure.  Patient can only speak in Hungarian, I obtained the history through an  from the patient and her .  She was apparently well until this morning when she was found down and having tonic-clonic movements.  Per the patient, she did not remember prior events leading up to that.  After which,  noted small amounts of blood from her mouth and noted bilateral leg pain prompting consult to the ED.  Patient reports not taking any of her maintenance medications and has not had episodes of seizures before      At the ED, she was aphasic then improved, but no other focal deficits noted. Stroke alert was called, CT head without contrast, CTA head and CTA neck were done, showed no acute intracranial hemorrhage and no evidence of residual or recurrent aneurysm.  Labs were pertinent for lactic acidosis LA 3.4, elevated troponin 11, she was given 1 dose of Keppra 1.5 g IV and admitted for further workup and close observation.      Past Medical History:      Diagnosis Date    Accelerated hypertension 8/8/2012    Diabetes mellitus (HCC)     Glaucoma     Hyperlipidemia     Hypertension     Hypothyroidism     Intracranial aneurysm 8/8/2012    Tobacco abuse 8/8/2012    quit       Past Surgical History:        Procedure Laterality Date    COLONOSCOPY  8/5/2014    2.5 cm diameter submucosal lipoma proximal transverse  arteries or vertebral arteries. 5. No acute intracranial hemorrhage or edema. 6. Results were called by core team to Dr. RUBY on 10/16/2024 at 13:50.     CTA NECK W CONTRAST    Result Date: 10/16/2024  EXAMINATION: CT OF THE HEAD WITHOUT CONTRAST; CTA OF THE HEAD WITH CONTRAST; CTA OF THE NECK  10/16/2024 1:33 pm TECHNIQUE: CT of the head was performed without the administration of intravenous contrast. Automated exposure control, iterative reconstruction, and/or weight based adjustment of the mA/kV was utilized to reduce the radiation dose to as low as reasonably achievable.; CTA of the head/brain was performed with the administration of intravenous contrast. Multiplanar reformatted images are provided for review.  MIP images are provided for review. Automated exposure control, iterative reconstruction, and/or weight based adjustment of the mA/kV was utilized to reduce the radiation dose to as low as reasonably achievable.; CTA of the neck was performed with the administration of intravenous contrast. Multiplanar reformatted images are provided for review. MIP images are provided for review. Stenosis of the internal carotid arteries measured using NASCET criteria. Automated exposure control, iterative reconstruction, and/or weight based adjustment of the mA/kV was utilized to reduce the radiation dose to as low as reasonably achievable. COMPARISON: Correlation made with prior from February 6, 2014 HISTORY: ORDERING SYSTEM PROVIDED HISTORY: stroke TECHNOLOGIST PROVIDED HISTORY: Has a \"code stroke\" or \"stroke alert\" been called?->Yes Reason for exam:->stroke Decision Support Exception - unselect if not a suspected or confirmed emergency medical condition->Emergency Medical Condition (MA) What reading provider will be dictating this exam?->CRC FINDINGS: CT head: No acute intracranial hemorrhage or edema.  No abnormal extra-axial fluid collections.  Redemonstration of left-sided craniotomy.  Aneurysm clip with

## 2024-10-16 NOTE — PROCEDURES
PROCEDURE NOTE  Date: 10/16/2024   Name: Cecilia Nichole  YOB: 1960    Procedures        MRI screening needed prior to MRI scan.

## 2024-10-16 NOTE — PROGRESS NOTES
Cecilia Nichole  [id number]  10/16/24    Seen with Medical Residents    Cecilia appears to have had a seizure event.  describes a seizure like shaking. She lost bladder control, she bit her tongue and arrived with an inability to answer questions. She is able to answer questions for me. She had difficulty with the  pad.    Cor RRR  Lungs CTA  Clothing damp  CNS intact.    Kera   EEG  MRI  Neurology consult    Electronically signed by Jj Khan MD on 10/16/24 at 3:26 PM EDT

## 2024-10-16 NOTE — ED NOTES
Stroke/ Meliza Alert Time:stroke 1309      Time Neurologist called:Yosef @ 1311  :    Time CT Notified:       BRAIN alert time: (If applicable)

## 2024-10-16 NOTE — PROGRESS NOTES
4 Eyes Skin Assessment     NAME:  Cecilia Nichole  YOB: 1960  MEDICAL RECORD NUMBER:  [id number]    The patient is being assessed for  Admission    I agree that at least one RN has performed a thorough Head to Toe Skin Assessment on the patient. ALL assessment sites listed below have been assessed.      Areas assessed by both nurses:    Head, Face, Ears, Shoulders, Back, Chest, Arms, Elbows, Hands, Sacrum. Buttock, Coccyx, Ischium, Legs. Feet and Heels, and Under Medical Devices         Does the Patient have a Wound? No noted wound(s)       Pb Prevention initiated by RN: Yes  Wound Care Orders initiated by RN: No    Pressure Injury (Stage 3,4, Unstageable, DTI, NWPT, and Complex wounds) if present, place Wound referral order by RN under : No    New Ostomies, if present place, Ostomy referral order under : No     Nurse 1 eSignature: Electronically signed by Kathya Soares RN on 10/16/24 at 7:37 PM EDT    **SHARE this note so that the co-signing nurse can place an eSignature**    Nurse 2 eSignature: Electronically signed by Cari Sauer RN on 10/16/24 at 7:50 PM EDT

## 2024-10-16 NOTE — ED PROVIDER NOTES
Department of Emergency Medicine   ED  Provider Note  Admit Date/RoomTime: 10/16/2024 12:49 PM  ED Room: 22/22          History of Present Illness:  10/16/24, Time: 2:02 PM EDT  Chief Complaint   Patient presents with    Seizures     EMS stated patient had fall with seizure like activity. Patient appeared to be postictal, but patient is Irish speaking. Barrier in communication.                 Cecilia Nichole is a 63 y.o. female presenting to the ED for possible seizure.  [place]  [place] is with her, and they do not speak English.  Spouse states that the patient fell over and started bleeding from mouth.  He says he cannot describe whether she was seizing or not.  No seizure history.  EMS reports that she was hemodynamically stable in the field.  She had no seizure activity was witnessed by them.  However, bystanders voiced concerns.  She does have a history of craniotomy.  This was several years ago.  Cannot obtain any other history at this time.    Review of Systems:   Unable to obtain due to the patient's current mental status      --------------------------------------------- PAST HISTORY ---------------------------------------------  Past Medical History:  has a past medical history of Accelerated hypertension, Diabetes mellitus (HCC), Glaucoma, Hyperlipidemia, Hypertension, Hypothyroidism, Intracranial aneurysm, and Tobacco abuse.    Past Surgical History:  has a past surgical history that includes Tubal ligation (1992); craniotomy (2/7/2014); and Colonoscopy (8/5/2014).    Social History:  reports that she quit smoking about 11 years ago. Her smoking use included cigarettes. She started smoking about 12 years ago. She has a 0.1 pack-year smoking history. She has never used smokeless tobacco. She reports that she does not drink alcohol and does not use drugs.    Family History: family history includes Diabetes in her daughter and maternal grandmother; Heart Disease in her  maternal grandmother; High Blood Pressure in her maternal grandmother.. Unless otherwise noted, family history is non contributory    The patient’s home medications have been reviewed.    Allergies: Patient has no known allergies.        ---------------------------------------------------PHYSICAL EXAM--------------------------------------    Constitutional/General: Alert    Head: Normocephalic and atraumatic  Eyes: PERRL, EOMI, sclera non icteric  Mouth: Oropharynx clear, handling secretions, no trismus, no asymmetry of the posterior oropharynx or uvular edema  Neck: Supple, full ROM, no stridor, no meningeal signs  Respiratory: Lungs clear to auscultation bilaterally, no wheezes, rales, or rhonchi. Not in respiratory distress  Cardiovascular:  Regular rate. Regular rhythm.  2+ distal pulses. Equal extremity pulses.   Chest: No chest wall tenderness  GI:  Abdomen Soft, Non tender, Non distended. No rebound, guarding, or rigidity. No pulsatile masses.  Musculoskeletal: Moves all extremities x 4. Warm and well perfused, no clubbing, cyanosis, or edema. Capillary refill <3 seconds  Integument: skin warm and dry. No rashes.   Neurologic: See NIH  Psychiatric: Normal Affect    NIH Stroke Scale/Score at time of initial evaluation:  1A: Level of Consciousness 0 - alert; keenly responsive   1B: Ask Month and Age 2 - answers neither question correctly   1C: Tell Patient To Open and Close Eyes, then Hand  Squeeze 0 - performs both tasks correctly   2: Test Horizontal Extraocular Movements 0 - normal   3: Test Visual Fields 0 - no visual loss   4: Test Facial Palsy 0 - normal symmetric movement   5A: Test Left Arm Motor Drift 0 - no drift, limb holds 90 (or 45) degrees for full 10 seconds   5B: Test Right Arm Motor Drift 0 - no drift, limb holds 90 (or 45) degrees for full 10 seconds   6A: Test Left Leg Motor Drift 0 - no drift; leg holds 30 degree position for full 5 seconds   6B: Test Right Leg Motor Drift 0 - no drift;

## 2024-10-17 ENCOUNTER — APPOINTMENT (OUTPATIENT)
Dept: NEUROLOGY | Age: 64
End: 2024-10-17
Payer: MEDICAID

## 2024-10-17 ENCOUNTER — APPOINTMENT (OUTPATIENT)
Dept: MRI IMAGING | Age: 64
End: 2024-10-17
Payer: MEDICAID

## 2024-10-17 PROBLEM — D51.0 PERNICIOUS ANEMIA: Status: ACTIVE | Noted: 2024-10-17

## 2024-10-17 LAB
ANION GAP SERPL CALCULATED.3IONS-SCNC: 8 MMOL/L (ref 7–16)
BASOPHILS # BLD: 0.03 K/UL (ref 0–0.2)
BASOPHILS NFR BLD: 1 % (ref 0–2)
BUN SERPL-MCNC: 20 MG/DL (ref 6–23)
CALCIUM SERPL-MCNC: 9.3 MG/DL (ref 8.6–10.2)
CHLORIDE SERPL-SCNC: 100 MMOL/L (ref 98–107)
CO2 SERPL-SCNC: 31 MMOL/L (ref 22–29)
CREAT SERPL-MCNC: 1.1 MG/DL (ref 0.5–1)
EKG ATRIAL RATE: 77 BPM
EKG P AXIS: 19 DEGREES
EKG P-R INTERVAL: 126 MS
EKG Q-T INTERVAL: 438 MS
EKG QRS DURATION: 84 MS
EKG QTC CALCULATION (BAZETT): 495 MS
EKG R AXIS: 3 DEGREES
EKG T AXIS: 0 DEGREES
EKG VENTRICULAR RATE: 77 BPM
EOSINOPHIL # BLD: 0.08 K/UL (ref 0.05–0.5)
EOSINOPHILS RELATIVE PERCENT: 2 % (ref 0–6)
ERYTHROCYTE [DISTWIDTH] IN BLOOD BY AUTOMATED COUNT: 13 % (ref 11.5–15)
GFR, ESTIMATED: 57 ML/MIN/1.73M2
GLUCOSE SERPL-MCNC: 175 MG/DL (ref 74–99)
HCT VFR BLD AUTO: 37 % (ref 34–48)
HGB BLD-MCNC: 12.2 G/DL (ref 11.5–15.5)
IMM GRANULOCYTES # BLD AUTO: <0.03 K/UL (ref 0–0.58)
IMM GRANULOCYTES NFR BLD: 0 % (ref 0–5)
LYMPHOCYTES NFR BLD: 1.2 K/UL (ref 1.5–4)
LYMPHOCYTES RELATIVE PERCENT: 23 % (ref 20–42)
MAGNESIUM SERPL-MCNC: 2.1 MG/DL (ref 1.6–2.6)
MCH RBC QN AUTO: 29.8 PG (ref 26–35)
MCHC RBC AUTO-ENTMCNC: 33 G/DL (ref 32–34.5)
MCV RBC AUTO: 90.5 FL (ref 80–99.9)
MONOCYTES NFR BLD: 0.55 K/UL (ref 0.1–0.95)
MONOCYTES NFR BLD: 11 % (ref 2–12)
NEUTROPHILS NFR BLD: 64 % (ref 43–80)
NEUTS SEG NFR BLD: 3.37 K/UL (ref 1.8–7.3)
PHOSPHATE SERPL-MCNC: 3.9 MG/DL (ref 2.5–4.5)
PLATELET # BLD AUTO: 267 K/UL (ref 130–450)
PMV BLD AUTO: 9.6 FL (ref 7–12)
POTASSIUM SERPL-SCNC: 4.3 MMOL/L (ref 3.5–5)
RBC # BLD AUTO: 4.09 M/UL (ref 3.5–5.5)
SODIUM SERPL-SCNC: 139 MMOL/L (ref 132–146)
WBC OTHER # BLD: 5.2 K/UL (ref 4.5–11.5)

## 2024-10-17 PROCEDURE — 95819 EEG AWAKE AND ASLEEP: CPT | Performed by: PSYCHIATRY & NEUROLOGY

## 2024-10-17 PROCEDURE — 80048 BASIC METABOLIC PNL TOTAL CA: CPT

## 2024-10-17 PROCEDURE — 99239 HOSP IP/OBS DSCHRG MGMT >30: CPT | Performed by: INTERNAL MEDICINE

## 2024-10-17 PROCEDURE — 6360000002 HC RX W HCPCS

## 2024-10-17 PROCEDURE — 95819 EEG AWAKE AND ASLEEP: CPT

## 2024-10-17 PROCEDURE — 83735 ASSAY OF MAGNESIUM: CPT

## 2024-10-17 PROCEDURE — 2580000003 HC RX 258

## 2024-10-17 PROCEDURE — 84100 ASSAY OF PHOSPHORUS: CPT

## 2024-10-17 PROCEDURE — A9577 INJ MULTIHANCE: HCPCS | Performed by: RADIOLOGY

## 2024-10-17 PROCEDURE — 96372 THER/PROPH/DIAG INJ SC/IM: CPT

## 2024-10-17 PROCEDURE — 70553 MRI BRAIN STEM W/O & W/DYE: CPT

## 2024-10-17 PROCEDURE — 93010 ELECTROCARDIOGRAM REPORT: CPT | Performed by: INTERNAL MEDICINE

## 2024-10-17 PROCEDURE — 85025 COMPLETE CBC W/AUTO DIFF WBC: CPT

## 2024-10-17 PROCEDURE — 96375 TX/PRO/DX INJ NEW DRUG ADDON: CPT

## 2024-10-17 PROCEDURE — 36415 COLL VENOUS BLD VENIPUNCTURE: CPT

## 2024-10-17 PROCEDURE — G0378 HOSPITAL OBSERVATION PER HR: HCPCS

## 2024-10-17 PROCEDURE — 6360000004 HC RX CONTRAST MEDICATION: Performed by: RADIOLOGY

## 2024-10-17 PROCEDURE — 6370000000 HC RX 637 (ALT 250 FOR IP)

## 2024-10-17 PROCEDURE — 99204 OFFICE O/P NEW MOD 45 MIN: CPT | Performed by: PSYCHIATRY & NEUROLOGY

## 2024-10-17 RX ORDER — CYANOCOBALAMIN 1000 UG/ML
1000 INJECTION, SOLUTION INTRAMUSCULAR; SUBCUTANEOUS ONCE
Status: COMPLETED | OUTPATIENT
Start: 2024-10-17 | End: 2024-10-17

## 2024-10-17 RX ORDER — LORAZEPAM 2 MG/ML
0.5 INJECTION INTRAMUSCULAR ONCE
Status: COMPLETED | OUTPATIENT
Start: 2024-10-17 | End: 2024-10-17

## 2024-10-17 RX ORDER — LEVOTHYROXINE SODIUM 100 UG/1
100 TABLET ORAL DAILY
Status: DISCONTINUED | OUTPATIENT
Start: 2024-10-17 | End: 2024-10-18 | Stop reason: HOSPADM

## 2024-10-17 RX ADMIN — AMLODIPINE BESYLATE 10 MG: 10 TABLET ORAL at 09:11

## 2024-10-17 RX ADMIN — LEVETIRACETAM 500 MG: 500 TABLET, FILM COATED ORAL at 22:12

## 2024-10-17 RX ADMIN — SODIUM CHLORIDE, PRESERVATIVE FREE 6 ML: 5 INJECTION INTRAVENOUS at 22:13

## 2024-10-17 RX ADMIN — GADOBENATE DIMEGLUMINE 15 ML: 529 INJECTION, SOLUTION INTRAVENOUS at 20:39

## 2024-10-17 RX ADMIN — LEVETIRACETAM 500 MG: 500 TABLET, FILM COATED ORAL at 09:11

## 2024-10-17 RX ADMIN — CYANOCOBALAMIN 1000 MCG: 1000 INJECTION, SOLUTION INTRAMUSCULAR; SUBCUTANEOUS at 23:14

## 2024-10-17 RX ADMIN — LORAZEPAM 0.5 MG: 2 INJECTION INTRAMUSCULAR; INTRAVENOUS at 19:56

## 2024-10-17 RX ADMIN — ENOXAPARIN SODIUM 40 MG: 100 INJECTION SUBCUTANEOUS at 09:12

## 2024-10-17 NOTE — PROGRESS NOTES
[facility]  Internal Medicine Residency Program  Progress Note - House Team 1    Patient:  Cecilia Nichole 63 y.o. female MRN: [id number]     Date of Service: 10/17/2024     CC:   Chief Complaint   Patient presents with    Seizures     EMS stated patient had fall with seizure like activity. Patient appeared to be postictal, but patient is Central African speaking. Barrier in communication.      Overnight events: no acute overnight events     Subjective     Patient seen at bedside this morning with her . An interpreted ([place]) was used to communicate with the patient. She over all feels better than yesterday. She had no recurrence of her seizures overnight. Her bilateral leg pain is better than yesterday. No other subjective complaints.    Objective     Physical Exam:  Vitals: /72   Pulse 68   Temp 98 °F (36.7 °C) (Temporal)   Resp 16   Wt 77 kg (169 lb 12.1 oz)   SpO2 100%   BMI 33.15 kg/m²     I & O - 24hr: No intake/output data recorded.   General Appearance: alert, appears stated age, cooperative, and no distress  HEENT:  Head: Normocephalic, no lesions, without obvious abnormality, (+) wounds on her tongue  Neck: no adenopathy, no carotid bruit, and supple, symmetrical, trachea midline  Lung: clear to auscultation bilaterally  Heart: regular rate and rhythm, S1, S2 normal, no murmur, click, rub or gallop  Abdomen: soft, non-tender; bowel sounds normal; no masses,  no organomegaly  Extremities:  no edema, redness or tenderness in the calves or thighs and abrasions on bilateral ankles  Musculokeletal: No joint swelling, no muscle tenderness. ROM normal in all joints of extremities.   Neurologic: Mental status: Alert, oriented, thought content appropriate, pupils 2-3 mm EBRTL, full and intact EOMs, mild central facial palsy, right, no tongue deviation, good shoulder shrug, 5/5 MMT upper and lower extremities    Pertinent Labs & Imaging Studies     Recent Labs     10/16/24  1300   HGB  arterial occlusion. 4. No evidence of stenosis involving the carotid arteries or vertebral arteries. 5. No acute intracranial hemorrhage or edema. 6. Results were called by core team to Dr. RUBY on 10/16/2024 at 13:50.       No results found for this or any previous visit.       Current Medications:    Current Facility-Administered Medications   Medication Dose Route Frequency Provider Last Rate Last Admin    sodium chloride flush 0.9 % injection 5-40 mL  5-40 mL IntraVENous 2 times per day Niecy Thomas MD   10 mL at 10/16/24 2047    sodium chloride flush 0.9 % injection 5-40 mL  5-40 mL IntraVENous PRN Niecy Thomas MD        0.9 % sodium chloride infusion   IntraVENous PRN Niecy Thomas MD        potassium chloride (KLOR-CON M) extended release tablet 40 mEq  40 mEq Oral PRN Niecy Thomas MD        Or    potassium bicarb-citric acid (EFFER-K) effervescent tablet 40 mEq  40 mEq Oral PRN Niecy Thomas MD        Or    potassium chloride 10 mEq/100 mL IVPB (Peripheral Line)  10 mEq IntraVENous PRN Niecy Thomas MD        magnesium sulfate 2000 mg in 50 mL IVPB premix  2,000 mg IntraVENous PRN Niecy Thomas MD        enoxaparin (LOVENOX) injection 40 mg  40 mg SubCUTAneous Daily Niecy Thomas MD   40 mg at 10/16/24 1841    ondansetron (ZOFRAN-ODT) disintegrating tablet 4 mg  4 mg Oral Q8H PRN Niecy Thomas MD        Or    ondansetron (ZOFRAN) injection 4 mg  4 mg IntraVENous Q6H PRN Niecy Thomas MD        polyethylene glycol (GLYCOLAX) packet 17 g  17 g Oral Daily PRN Niecy Thomas MD        acetaminophen (TYLENOL) tablet 650 mg  650 mg Oral Q6H PRN Niecy Thomas MD        Or    acetaminophen (TYLENOL) suppository 650 mg  650 mg Rectal Q6H PRN Niecy Thomas MD        levETIRAcetam (KEPPRA) tablet 500 mg  500 mg Oral BID Niecy Thomas MD        amLODIPine (NORVASC) tablet 10 mg  10 mg Oral Daily Niecy Thomas MD   10 mg at 10/16/24 1841          sodium chloride flush

## 2024-10-17 NOTE — CONSULTS
bilaterally.   EOMs: full, no nystagmus.   V. Facial sensation intact to light touch bilaterally  VII: Facial movements symmetric and strong  VIII: Hearing intact to voice  IX,X: Palate elevates symmetrically. No dysarthria  XI: Sternocleidomastoid and trapezius 5/5 bilaterally   XII: Tongue is midline    Motor     Right Left   Right Left   Deltoid 5 5  Hip Flexion 5 5   Biceps 5 5  Knee Extension 5 5   Triceps 5 5  Knee Flexion 5 5   Handgrip 5 5  Ankle Dorsiflexion 5 5       Ankle Plantarflexion 5 5     Pronator drift: absent bilaterally    Sensation  Light Touch: Intact distally in all four limbs  Reflexes     Right Left   Biceps 2 2   Brachioradialis 2 2   Patellar 2 2   Achilles 2 2   ankle clonus none none   Babinski absent absent     Coordination  Rapid alternating movements normal in bilateral upper extremities  Finger to nose testing normal bilaterally    Gait  Normal base, stride, and arm swing with casual gait. 2-3 steps to turn.         Labs  Recent Labs     10/16/24  1300 10/17/24  0632    139   K 4.2 4.3    100   CO2 26 31*   BUN 21 20   CREATININE 1.1* 1.1*   GLUCOSE 166* 175*   CALCIUM 9.2 9.3   BILITOT 0.2  --    ALKPHOS 91  --    AST 18  --    ALT 11  --    WBC 5.3 5.2   RBC 4.43 4.09   HGB 13.3 12.2   HCT 40.0 37.0   MCV 90.3 90.5   MCH 30.0 29.8   MCHC 33.3 33.0   RDW 12.9 13.0    267   MPV 10.1 9.6   LACTA 3.4*  --        Imaging  XR LUMBAR SPINE (2-3 VIEWS)   Final Result   Unremarkable examination of the lumbar spine.         XR CHEST PORTABLE   Final Result   Low lung volumes.  Atherosclerotic disease and prominence of the cardiac   silhouette.  No acute cardiopulmonary pathology.         CT HEAD WO CONTRAST   Final Result   1. Redemonstration of postsurgical changes with evidence of left-sided   craniotomy and aneurysm clip in left aspect of suprasellar cistern with   surrounding metallic artifact.   2. No evidence of residual or recurrent aneurysm.   3. No evidence of  intracranial arterial occlusion.   4. No evidence of stenosis involving the carotid arteries or vertebral   arteries.   5. No acute intracranial hemorrhage or edema.   6. Results were called by core team to Dr. RUBY on 10/16/2024 at 13:50.         CTA HEAD W CONTRAST   Final Result   1. Redemonstration of postsurgical changes with evidence of left-sided   craniotomy and aneurysm clip in left aspect of suprasellar cistern with   surrounding metallic artifact.   2. No evidence of residual or recurrent aneurysm.   3. No evidence of intracranial arterial occlusion.   4. No evidence of stenosis involving the carotid arteries or vertebral   arteries.   5. No acute intracranial hemorrhage or edema.   6. Results were called by core team to Dr. RUBY on 10/16/2024 at 13:50.         CTA NECK W CONTRAST   Final Result   1. Redemonstration of postsurgical changes with evidence of left-sided   craniotomy and aneurysm clip in left aspect of suprasellar cistern with   surrounding metallic artifact.   2. No evidence of residual or recurrent aneurysm.   3. No evidence of intracranial arterial occlusion.   4. No evidence of stenosis involving the carotid arteries or vertebral   arteries.   5. No acute intracranial hemorrhage or edema.   6. Results were called by core team to Dr. RUBY on 10/16/2024 at 13:50.         MRI BRAIN W WO CONTRAST    (Results Pending)         Electronically signed by Varghese Sosa DO on 10/17/2024 at 4:43 PM       Session ID: [id number]  Language: Citizen of [place]   ID: #[id number]   Name: Kelly

## 2024-10-17 NOTE — DISCHARGE SUMMARY
[facility]  Discharge Summary    PCP: Tiffany Ocampo MD    Admit Date:10/16/2024  Discharge Date: 10/18/2024    Chief Complaint   Patient presents with    Seizures     EMS stated patient had fall with seizure like activity. Patient appeared to be postictal, but patient is Romanian speaking. Barrier in communication.          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 female with a PMHx of aneurysm s/p craniotomy, hypertension, obesity, hyperlipidemia, prediabetes, presented to the ED with concerns for seizure, apparently well until morning of admission when she was found down and having tonic-clonic movements.  Per the patient, she did not remember prior events leading up to that.  After which,  noted small amounts of blood from her mouth and noted bilateral leg pain prompting consult to the ED.  Patient reports not taking any of her maintenance medications and has not had episodes of seizures before. At the ED, she was aphasic then improved, but no other focal deficits noted. Stroke alert was called, CT head without contrast, CTA head and CTA neck were done, showed no acute intracranial hemorrhage and no evidence of residual or recurrent aneurysm.  Labs were pertinent for lactic acidosis LA 3.4, elevated troponin 11, she was given 1 dose of Keppra 1.5 g IV and admitted for further workup and close observation. Continue Keppra 500 mg BID and started Amlodipine. Vit B12 levels noted to be low, given Cyanocobalamin injections x 2 doses. TSH noted to be 10.02, resumed home med Synthyroid 100 mcg tab daily. EEG was

## 2024-10-17 NOTE — PROCEDURES
[facility] Neurodiagnostic Report    MRN: [id number]  PATIENT NAME: Cecilia Nichole  DATE OF REPORT: 10/17/2024    DATE OF SERVICE: 10/17/2024  PHYSICIAN NAME: Varghese Sosa DO  STUDY ORDERED BY Dr. Thomas      Patient's : 1960  Patient's Age: 63 y.o.  Gender: female    PROCEDURE: Routine EEG with video      Clinical Interpretation:   This abnormal study showed evidence of:    Mild to moderate nonspecific cerebral dysfunction of the left temporal and right frontotemporal regions    Structural abnormalities should be considered for the findings above and appropriate imaging obtained if clinically indicated.    No seizures or epileptiform discharges were noted during this study.     __________________________  Electronically signed by: Varghese Sosa DO, 10/17/2024 10:45 AM      Patient Clinical Information   Reason for Study: The patient is undergoing evaluation for seizure(s)  Patient State: Awake  Primary neurological diagnosis: Seizure  Primary indication for monitoring: Risk stratification    Pertinent Medications and Treatments    levetiracetam     Sedatives administered: No  Intubated: No  Pharmacological paralytic: No    Reporting Period  Start of Study: 1022, 10/17/2024   End of Study:  104, 10/17/2024       EEG Description  Digital video and scalp EEG monitoring was performed using the standard protocol for this laboratory. Scalp electrodes were applied in the international 10/20 system. Multiple digital montage arrangements were utilized for evaluation. EKG and video were recorded.     Background:      Occipital rhythm (posterior dominant rhythm or PDR): Present  Frequency: 8.5 Hz  Voltage: Medium  Organization: good   Reactivity to eye opening/closure: Good    Drowsiness: Present - normal  Sleep: Stage 2 present - normal      Technical and Activation Procedures:  Hyperventilation: Not done  Photic stimulation: Done - physiologic driving noted  Reactivity to stimulation:

## 2024-10-17 NOTE — PROGRESS NOTES
Lighting rounds was done on pt . Educated pt on fall risk. Provided patient with yellow gripper socks and fall risk wristband.

## 2024-10-17 NOTE — CARE COORDINATION
Chart reviewed for transition of care at discharge. Observation for seizures. Per internal med note today, for MRI of the brain, EEG awake and asleep, and neurology on consult to see. Past medical history of aneurysm s/p craniotomy and HTN. Stat CT of the head and CTA of the neck completed in ER which showed no acute intercranial hemorrhage, and no evidence of residual or recurrent aneurysm. Met with patient and ESTEPHANIA Rodriguez, with  Ipad at bedside. Nepali Intrepreter # [id number], [place]. Patient stated that she lives with JEANNETTE in a 2 story home with her bed and bath on the 2nd floor. She staed that she is independent with ADL's, and uses no AD to ambulate. She has no DME at home, and has a history of [facility]. She will use again if needed. No history of [facility]. She has no PCP, and she was agreeable to have one was set up for her. Appointment is with Dr Ocampo 10/24/24 at 0830 am at [facility] on [address]. Her daughter and son live in [place]. Daughter Cheryl Garcia in contacts, and son is Tiburcio Winn, but patient has to find phone number. Granddaughter is in town, and she will find her number as well. Discharge plan is home when medically stable, with her JEANNETTE Rodriguez transporting. CM will follow.  Electronically signed by Judd Houser RN on 10/17/2024 at 3:07 PM    Case Management Assessment  Initial Evaluation    Date/Time of Evaluation: 10/17/2024 3:12 PM  Assessment Completed by: Judd Houser RN    If patient is discharged prior to next notation, then this note serves as note for discharge by case management.    Patient Name: Cecilia Nichole                   YOB: 1960  Diagnosis: Seizure (HCC) [R56.9]  New onset seizure (HCC) [R56.9]                   Date / Time: 10/16/2024 12:49 PM    Patient Admission Status: Observation   Readmission Risk (Low < 19, Mod (19-27), High > 27): No data recorded  Current PCP: Tiffany Ocampo MD  PCP verified by CM? Yes (New PCP  set up Dr Ocampo 10/24/24 at 0830 at [facility])    Chart Reviewed: Yes      History Provided by: Patient  Patient Orientation: Alert and Oriented    Patient Cognition: Alert    Hospitalization in the last 30 days (Readmission):  No    If yes, Readmission Assessment in  Navigator will be completed.    Advance Directives:      Code Status: Full Code   Patient's Primary Decision Maker is: Legal Next of Kin      Discharge Planning:    Patient lives with:   Type of Home:    Primary Care Giver: Self  Patient Support Systems include: Spouse/Significant Other, Children, Family Members   Current Financial resources:    Current community resources:    Current services prior to admission:              Current DME:              Type of Home Care services:       ADLS  Prior functional level: Independent in ADLs/IADLs  Current functional level: Independent in ADLs/IADLs    PT AM-PAC:   /24  OT AM-PAC:   /24    Family can provide assistance at DC: Yes  Would you like Case Management to discuss the discharge plan with any other family members/significant others, and if so, who? Yes  Plans to Return to Present Housing: Yes  Potential Assistance needed at discharge:              Potential DME:    Patient expects to discharge to:    Plan for transportation at discharge:      Financial    Payor: /     Does insurance require precert for SNF: No    Potential assistance Purchasing Medications:    Meds-to-Beds request: Yes      [id number] [place], [place] - [address] [phone] - F [phone] [address]  [place] [address]  Phone: [phone] Fax: [phone]      Notes:    Factors facilitating achievement of predicted outcomes: Family support    Barriers to discharge: Limited insight into deficits, Communication deficit, Long standing deficits, and non compliance      The Plan for Transition of Care is related to the following treatment goals of Seizure (HCC) [R56.9]  New onset

## 2024-10-17 NOTE — PROGRESS NOTES
Progress  Note  Chief Complaint   Patient presents with    Seizures     EMS stated patient had fall with seizure like activity. Patient appeared to be postictal, but patient is Sami speaking. Barrier in communication.      Historical Issues:  Current Facility-Administered Medications   Medication Dose Route Frequency Provider Last Rate Last Admin    levothyroxine (SYNTHROID) tablet 100 mcg  100 mcg Oral Daily Ketty Kim MD        cyanocobalamin injection 1,000 mcg  1,000 mcg IntraMUSCular Once Shante Wilkes DO        sodium chloride flush 0.9 % injection 5-40 mL  5-40 mL IntraVENous 2 times per day Niecy Thomas MD   10 mL at 10/16/24 2047    sodium chloride flush 0.9 % injection 5-40 mL  5-40 mL IntraVENous PRN Niecy Thomas MD        0.9 % sodium chloride infusion   IntraVENous PRN Niecy Thomas MD        potassium chloride (KLOR-CON M) extended release tablet 40 mEq  40 mEq Oral PRN Niecy Thomas MD        Or    potassium bicarb-citric acid (EFFER-K) effervescent tablet 40 mEq  40 mEq Oral PRN Niecy Thomas MD        Or    potassium chloride 10 mEq/100 mL IVPB (Peripheral Line)  10 mEq IntraVENous PRN Niecy Thomas MD        magnesium sulfate 2000 mg in 50 mL IVPB premix  2,000 mg IntraVENous PRN Niecy Thomas MD        enoxaparin (LOVENOX) injection 40 mg  40 mg SubCUTAneous Daily Niecy Thomas MD   40 mg at 10/17/24 0912    ondansetron (ZOFRAN-ODT) disintegrating tablet 4 mg  4 mg Oral Q8H PRN Niecy Thomas MD        Or    ondansetron (ZOFRAN) injection 4 mg  4 mg IntraVENous Q6H PRN Niecy Thomas MD        polyethylene glycol (GLYCOLAX) packet 17 g  17 g Oral Daily PRN Niecy Thomas MD        acetaminophen (TYLENOL) tablet 650 mg  650 mg Oral Q6H PRN Niecy Thomas MD        Or    acetaminophen (TYLENOL) suppository 650 mg  650 mg Rectal Q6H PRN Niecy Thomas MD        levETIRAcetam (KEPPRA) tablet 500 mg  500 mg Oral BID Niecy Thomas MD

## 2024-10-17 NOTE — ACP (ADVANCE CARE PLANNING)
Advance Care Planning   The patient has the following advanced directives on file:  Advance Directives       Power of  Living Will ACP-Advance Directive ACP-Power of     Not on File Filed on 08/09/12 Filed Not on File            The patient has appointed the following active healthcare agents:    Primary Decision Maker: Cheryl Garcia - Child - [phone]    The Patient has the following current code status:    Code Status: Full Code      Judd Houser RN  10/17/2024

## 2024-10-17 NOTE — DISCHARGE INSTRUCTIONS
Internal medicine    Follow ups  Please follow up with the internal medicine clinic at [facility].Your appointment has been scheduled for October 24, 2024 at 8:30 AM.  Please keep all other follow up appointments:  Future Appointments   Date Time Provider Department Center   10/24/2024  8:30 AM Tiffany Ocampo MD [facility] E[facility] DEP   Neurology follow-up     Changes in healthcare   Please take all medications as indicated  Diet: regular diet   Activity:   No driving  No riding bicycles in traffic  No operating dangerous/heavy machinery  No engaging in activities at dangerous heights including using ladders  No taking baths unattended  No swimming  No engaging in activities near fire unattended  Recommend caution or avoidance of cooking or using potentially dangerous objects such as knives.  Avoid any activities where sudden loss of consciousness could result in injury to yourself or others  New Medications started during this hospital stay  Levetiracetam (Keppra) 500 mg tab BID  Changes to your medications  Metformin 500 mg daily  Medications you should stop taking   Hydrochlorothiazide 25 mg tab once daily  Spironolactone 50 mg tab once daily  Hydralazine 100 mg tab TID  Lisinopril 40 mg tab daily  Additional labs, testing or imaging needed after discharge   BMP  Please contact us if you have any concerns, wish to change or make an appointment:  Internal medicine clinic   Phone: [phone]  Fax: [phone]  [facility] [id number]  Should you have further questions in regards to this visit, you can review your clinical note and after visit summary document on your [employer] account.     Other than any new prescriptions given to you today, the list of home medications on this After Visit Summary are based on information provided to us from you and your healthcare providers. This information, including the list, dose, and frequency of medications is only as accurate as the information you  provided. If you have any questions or concerns about your home medications, please contact your Primary Care Physician for further clarification.

## 2024-10-18 VITALS
OXYGEN SATURATION: 97 % | TEMPERATURE: 96.2 F | RESPIRATION RATE: 18 BRPM | BODY MASS INDEX: 33.15 KG/M2 | DIASTOLIC BLOOD PRESSURE: 67 MMHG | WEIGHT: 169.75 LBS | HEART RATE: 70 BPM | SYSTOLIC BLOOD PRESSURE: 142 MMHG

## 2024-10-18 LAB
ANION GAP SERPL CALCULATED.3IONS-SCNC: 10 MMOL/L (ref 7–16)
BASOPHILS # BLD: 0.02 K/UL (ref 0–0.2)
BASOPHILS NFR BLD: 1 % (ref 0–2)
BUN SERPL-MCNC: 22 MG/DL (ref 6–23)
CALCIUM SERPL-MCNC: 9.2 MG/DL (ref 8.6–10.2)
CHLORIDE SERPL-SCNC: 100 MMOL/L (ref 98–107)
CO2 SERPL-SCNC: 27 MMOL/L (ref 22–29)
CREAT SERPL-MCNC: 1.1 MG/DL (ref 0.5–1)
EOSINOPHIL # BLD: 0.09 K/UL (ref 0.05–0.5)
EOSINOPHILS RELATIVE PERCENT: 2 % (ref 0–6)
ERYTHROCYTE [DISTWIDTH] IN BLOOD BY AUTOMATED COUNT: 12.9 % (ref 11.5–15)
GFR, ESTIMATED: 54 ML/MIN/1.73M2
GLUCOSE SERPL-MCNC: 169 MG/DL (ref 74–99)
HCT VFR BLD AUTO: 35.9 % (ref 34–48)
HGB BLD-MCNC: 11.7 G/DL (ref 11.5–15.5)
IMM GRANULOCYTES # BLD AUTO: <0.03 K/UL (ref 0–0.58)
IMM GRANULOCYTES NFR BLD: 0 % (ref 0–5)
LYMPHOCYTES NFR BLD: 1.36 K/UL (ref 1.5–4)
LYMPHOCYTES RELATIVE PERCENT: 33 % (ref 20–42)
MAGNESIUM SERPL-MCNC: 2.1 MG/DL (ref 1.6–2.6)
MCH RBC QN AUTO: 29.6 PG (ref 26–35)
MCHC RBC AUTO-ENTMCNC: 32.6 G/DL (ref 32–34.5)
MCV RBC AUTO: 90.9 FL (ref 80–99.9)
MONOCYTES NFR BLD: 0.51 K/UL (ref 0.1–0.95)
MONOCYTES NFR BLD: 12 % (ref 2–12)
NEUTROPHILS NFR BLD: 52 % (ref 43–80)
NEUTS SEG NFR BLD: 2.16 K/UL (ref 1.8–7.3)
PHOSPHATE SERPL-MCNC: 3.9 MG/DL (ref 2.5–4.5)
PLATELET # BLD AUTO: 261 K/UL (ref 130–450)
PMV BLD AUTO: 9.6 FL (ref 7–12)
POTASSIUM SERPL-SCNC: 4.2 MMOL/L (ref 3.5–5)
RBC # BLD AUTO: 3.95 M/UL (ref 3.5–5.5)
SODIUM SERPL-SCNC: 137 MMOL/L (ref 132–146)
WBC OTHER # BLD: 4.2 K/UL (ref 4.5–11.5)

## 2024-10-18 PROCEDURE — 6370000000 HC RX 637 (ALT 250 FOR IP)

## 2024-10-18 PROCEDURE — 80048 BASIC METABOLIC PNL TOTAL CA: CPT

## 2024-10-18 PROCEDURE — 85025 COMPLETE CBC W/AUTO DIFF WBC: CPT

## 2024-10-18 PROCEDURE — 84100 ASSAY OF PHOSPHORUS: CPT

## 2024-10-18 PROCEDURE — 83735 ASSAY OF MAGNESIUM: CPT

## 2024-10-18 PROCEDURE — G0378 HOSPITAL OBSERVATION PER HR: HCPCS

## 2024-10-18 PROCEDURE — 36415 COLL VENOUS BLD VENIPUNCTURE: CPT

## 2024-10-18 RX ORDER — LEVOTHYROXINE SODIUM 100 UG/1
100 TABLET ORAL DAILY
Qty: 30 TABLET | Refills: 1 | Status: SHIPPED | OUTPATIENT
Start: 2024-10-18 | End: 2025-01-16

## 2024-10-18 RX ORDER — CHOLECALCIFEROL (VITAMIN D3) 25 MCG
TABLET ORAL
Qty: 60 TABLET | Refills: 1 | Status: SHIPPED | OUTPATIENT
Start: 2024-10-18

## 2024-10-18 RX ORDER — LEVETIRACETAM 500 MG/1
TABLET ORAL
Qty: 284 TABLET | Refills: 0 | Status: SHIPPED | OUTPATIENT
Start: 2024-10-18 | End: 2025-01-23

## 2024-10-18 RX ORDER — LEVETIRACETAM 500 MG/1
500 TABLET ORAL 2 TIMES DAILY
Qty: 60 TABLET | Refills: 1 | Status: CANCELLED | OUTPATIENT
Start: 2024-10-18

## 2024-10-18 RX ORDER — GLUCOSAMINE HCL/CHONDROITIN SU 500-400 MG
CAPSULE ORAL
Qty: 120 STRIP | Refills: 3 | Status: SHIPPED | OUTPATIENT
Start: 2024-10-18

## 2024-10-18 RX ORDER — AMLODIPINE BESYLATE 10 MG/1
10 TABLET ORAL DAILY
Qty: 30 TABLET | Refills: 1 | Status: SHIPPED | OUTPATIENT
Start: 2024-10-18

## 2024-10-18 RX ORDER — UBIDECARENONE 75 MG
100 CAPSULE ORAL DAILY
Qty: 30 TABLET | Refills: 3 | Status: SHIPPED | OUTPATIENT
Start: 2024-10-18 | End: 2025-10-18

## 2024-10-18 RX ADMIN — LEVOTHYROXINE SODIUM 100 MCG: 0.1 TABLET ORAL at 06:02

## 2024-10-18 RX ADMIN — LEVETIRACETAM 500 MG: 500 TABLET, FILM COATED ORAL at 09:29

## 2024-10-18 RX ADMIN — AMLODIPINE BESYLATE 10 MG: 10 TABLET ORAL at 09:29

## 2024-10-18 ASSESSMENT — ENCOUNTER SYMPTOMS
GASTROINTESTINAL NEGATIVE: 1
EYES NEGATIVE: 1
RESPIRATORY NEGATIVE: 1

## 2024-10-18 NOTE — PROGRESS NOTES
Patient was discharged before neurology rounded for follow up. Keppra 500 mg BID for 7 days followed by 750 mg BID was personally added to discharge med rec.    Recommend patient follow up with Patti MARIE since she is Bengali speaking

## 2024-10-18 NOTE — PROGRESS NOTES
This Rn reviewed discharge paperwork with pt through  session. Pt verbalized understanding. This RN removed IV from patient.

## 2024-10-18 NOTE — CARE COORDINATION
10/18/24 Discharge order noted:  Plan was home with SO PCP appointment was made and is on the AVS Pt is self pay and Keyshawn from [facility] will come and see patient for assistance Met with pt in room to advise that someone will come to the bedside to discuss financial assistance Pt previously expressed no needs for discharge SO is at bedside to tranport Electronically signed by Judd Otto RN on 10/18/2024 at 10:03 AM     11:00am Rec'd call from outpatient pharmacy about patient medications explained that Keyshawn from [facility] was here to assess patient for financial assistance but did not let CM know if she had any eligibility Advised there is no note in EPIC for me to determine if pt is eligible for assistance with medications.  Pt was advised to remain on unit until that could be determined however she left and presented to the pharmacy Electronically signed by Judd Otto RN on 10/18/2024 at 11:11 AM

## 2024-10-18 NOTE — PROGRESS NOTES
CLINICAL PHARMACY NOTE: MEDS TO BEDS    Total # of Prescriptions Filled: 10   The following medications were delivered to the patient:  Levetiracetam 500  Metformin 500  Amlodipine 10  Prodigy strips  Prodigy meter  Prodigy lancets  Aspirin 81  Vitamin d3 25mcg  Levothyroxine 100  Vitamin b 12 100      Additional Documentation:  Picked up In pharmacy